# Patient Record
Sex: FEMALE | Race: BLACK OR AFRICAN AMERICAN | NOT HISPANIC OR LATINO | Employment: FULL TIME | URBAN - METROPOLITAN AREA
[De-identification: names, ages, dates, MRNs, and addresses within clinical notes are randomized per-mention and may not be internally consistent; named-entity substitution may affect disease eponyms.]

---

## 2023-08-12 ENCOUNTER — HOSPITAL ENCOUNTER (OUTPATIENT)
Facility: HOSPITAL | Age: 33
Discharge: HOME | DRG: 761 | End: 2023-08-14
Attending: EMERGENCY MEDICINE | Admitting: SPECIALIST
Payer: COMMERCIAL

## 2023-08-12 DIAGNOSIS — N92.0 MENORRHAGIA WITH REGULAR CYCLE: ICD-10-CM

## 2023-08-12 DIAGNOSIS — D25.0 FIBROIDS, SUBMUCOSAL: ICD-10-CM

## 2023-08-12 DIAGNOSIS — D64.9 SYMPTOMATIC ANEMIA: ICD-10-CM

## 2023-08-12 DIAGNOSIS — D64.9 SEVERE ANEMIA: Primary | ICD-10-CM

## 2023-08-12 PROBLEM — D50.0 IRON DEFICIENCY ANEMIA DUE TO CHRONIC BLOOD LOSS: Status: ACTIVE | Noted: 2023-08-12

## 2023-08-12 LAB
ABO + RH BLD: NORMAL
ALBUMIN SERPL BCP-MCNC: 3.5 G/DL (ref 3.5–5.2)
ALP SERPL-CCNC: 35 U/L (ref 55–135)
ALT SERPL W/O P-5'-P-CCNC: <5 U/L (ref 10–44)
ANION GAP SERPL CALC-SCNC: 9 MMOL/L (ref 8–16)
ANISOCYTOSIS BLD QL SMEAR: ABNORMAL
AST SERPL-CCNC: 7 U/L (ref 10–40)
BACTERIA #/AREA URNS HPF: ABNORMAL /HPF
BASOPHILS # BLD AUTO: 0 K/UL (ref 0–0.2)
BASOPHILS NFR BLD: 0 % (ref 0–1.9)
BILIRUB SERPL-MCNC: 0.2 MG/DL (ref 0.1–1)
BILIRUB UR QL STRIP: NEGATIVE
BLD GP AB SCN CELLS X3 SERPL QL: NORMAL
BLD PROD TYP BPU: NORMAL
BLOOD UNIT EXPIRATION DATE: NORMAL
BLOOD UNIT TYPE CODE: 5100
BLOOD UNIT TYPE: NORMAL
BUN SERPL-MCNC: 4 MG/DL (ref 6–20)
CALCIUM SERPL-MCNC: 8.4 MG/DL (ref 8.7–10.5)
CHLORIDE SERPL-SCNC: 110 MMOL/L (ref 95–110)
CK SERPL-CCNC: 41 U/L (ref 20–180)
CLARITY UR: ABNORMAL
CO2 SERPL-SCNC: 21 MMOL/L (ref 23–29)
CODING SYSTEM: NORMAL
COLOR UR: COLORLESS
CREAT SERPL-MCNC: 0.8 MG/DL (ref 0.5–1.4)
CROSSMATCH INTERPRETATION: NORMAL
DACRYOCYTES BLD QL SMEAR: ABNORMAL
DIFFERENTIAL METHOD: ABNORMAL
DISPENSE STATUS: NORMAL
EOSINOPHIL # BLD AUTO: 0 K/UL (ref 0–0.5)
EOSINOPHIL NFR BLD: 0.5 % (ref 0–8)
ERYTHROCYTE [DISTWIDTH] IN BLOOD BY AUTOMATED COUNT: 23.2 % (ref 11.5–14.5)
EST. GFR  (NO RACE VARIABLE): >60 ML/MIN/1.73 M^2
FERRITIN SERPL-MCNC: <1 NG/ML (ref 20–300)
FOLATE SERPL-MCNC: 10.1 NG/ML (ref 4–24)
GLUCOSE SERPL-MCNC: 104 MG/DL (ref 70–110)
GLUCOSE UR QL STRIP: NEGATIVE
HCG INTACT+B SERPL-ACNC: <1.2 MIU/ML
HCT VFR BLD AUTO: 6.4 % (ref 37–48.5)
HCV AB SERPL QL IA: NEGATIVE
HEP C VIRUS HOLD SPECIMEN: NORMAL
HGB BLD-MCNC: 1.6 G/DL (ref 12–16)
HGB BLD-MCNC: 1.7 G/DL (ref 12–16)
HGB UR QL STRIP: ABNORMAL
HIV 1+2 AB+HIV1 P24 AG SERPL QL IA: NEGATIVE
HYALINE CASTS #/AREA URNS LPF: 0 /LPF
HYPOCHROMIA BLD QL SMEAR: ABNORMAL
IMM GRANULOCYTES # BLD AUTO: 0.06 K/UL (ref 0–0.04)
IMM GRANULOCYTES NFR BLD AUTO: 0.8 % (ref 0–0.5)
IRON SERPL-MCNC: 11 UG/DL (ref 30–160)
KETONES UR QL STRIP: NEGATIVE
LDH SERPL L TO P-CCNC: 131 U/L (ref 110–260)
LEUKOCYTE ESTERASE UR QL STRIP: ABNORMAL
LYMPHOCYTES # BLD AUTO: 1.1 K/UL (ref 1–4.8)
LYMPHOCYTES NFR BLD: 15.1 % (ref 18–48)
MCH RBC QN AUTO: 16 PG (ref 27–31)
MCHC RBC AUTO-ENTMCNC: 25 G/DL (ref 32–36)
MCV RBC AUTO: 64 FL (ref 82–98)
MICROSCOPIC COMMENT: ABNORMAL
MONOCYTES # BLD AUTO: 0.5 K/UL (ref 0.3–1)
MONOCYTES NFR BLD: 6.8 % (ref 4–15)
NEUTROPHILS # BLD AUTO: 5.6 K/UL (ref 1.8–7.7)
NEUTROPHILS NFR BLD: 76.8 % (ref 38–73)
NITRITE UR QL STRIP: NEGATIVE
NRBC BLD-RTO: 0 /100 WBC
NUM UNITS TRANS PACKED RBC: NORMAL
OVALOCYTES BLD QL SMEAR: ABNORMAL
PH UR STRIP: 7 [PH] (ref 5–8)
PLATELET # BLD AUTO: 121 K/UL (ref 150–450)
PLATELET BLD QL SMEAR: ABNORMAL
PMV BLD AUTO: 10.5 FL (ref 9.2–12.9)
POIKILOCYTOSIS BLD QL SMEAR: ABNORMAL
POTASSIUM SERPL-SCNC: 3.5 MMOL/L (ref 3.5–5.1)
PROT SERPL-MCNC: 6.1 G/DL (ref 6–8.4)
PROT UR QL STRIP: ABNORMAL
RBC # BLD AUTO: 1 M/UL (ref 4–5.4)
RBC #/AREA URNS HPF: >100 /HPF (ref 0–4)
RETICS/RBC NFR AUTO: 1.9 % (ref 0.5–2.5)
SATURATED IRON: 2 % (ref 20–50)
SODIUM SERPL-SCNC: 140 MMOL/L (ref 136–145)
SP GR UR STRIP: 1.01 (ref 1–1.03)
SPECIMEN OUTDATE: NORMAL
SQUAMOUS #/AREA URNS HPF: 1 /HPF
STOMATOCYTES BLD QL SMEAR: PRESENT
TARGETS BLD QL SMEAR: ABNORMAL
TOTAL IRON BINDING CAPACITY: 522 UG/DL (ref 250–450)
TRANSFERRIN SERPL-MCNC: 353 MG/DL (ref 200–375)
UNIDENT CRYS URNS QL MICRO: ABNORMAL
URN SPEC COLLECT METH UR: ABNORMAL
UROBILINOGEN UR STRIP-ACNC: NEGATIVE EU/DL
VIT B12 SERPL-MCNC: 534 PG/ML (ref 210–950)
WBC # BLD AUTO: 7.34 K/UL (ref 3.9–12.7)
WBC #/AREA URNS HPF: 3 /HPF (ref 0–5)
WBC CLUMPS URNS QL MICRO: ABNORMAL

## 2023-08-12 PROCEDURE — 82728 ASSAY OF FERRITIN: CPT | Performed by: EMERGENCY MEDICINE

## 2023-08-12 PROCEDURE — 85025 COMPLETE CBC W/AUTO DIFF WBC: CPT | Mod: 91 | Performed by: SPECIALIST

## 2023-08-12 PROCEDURE — G0378 HOSPITAL OBSERVATION PER HR: HCPCS

## 2023-08-12 PROCEDURE — 83615 LACTATE (LD) (LDH) ENZYME: CPT | Performed by: EMERGENCY MEDICINE

## 2023-08-12 PROCEDURE — 85018 HEMOGLOBIN: CPT | Performed by: EMERGENCY MEDICINE

## 2023-08-12 PROCEDURE — P9016 RBC LEUKOCYTES REDUCED: HCPCS | Performed by: EMERGENCY MEDICINE

## 2023-08-12 PROCEDURE — 85045 AUTOMATED RETICULOCYTE COUNT: CPT | Performed by: EMERGENCY MEDICINE

## 2023-08-12 PROCEDURE — 99285 EMERGENCY DEPT VISIT HI MDM: CPT | Mod: 25

## 2023-08-12 PROCEDURE — 81000 URINALYSIS NONAUTO W/SCOPE: CPT | Performed by: EMERGENCY MEDICINE

## 2023-08-12 PROCEDURE — 20000000 HC ICU ROOM

## 2023-08-12 PROCEDURE — 82607 VITAMIN B-12: CPT | Performed by: EMERGENCY MEDICINE

## 2023-08-12 PROCEDURE — 36415 COLL VENOUS BLD VENIPUNCTURE: CPT | Performed by: SPECIALIST

## 2023-08-12 PROCEDURE — 82746 ASSAY OF FOLIC ACID SERUM: CPT | Performed by: EMERGENCY MEDICINE

## 2023-08-12 PROCEDURE — 82550 ASSAY OF CK (CPK): CPT | Performed by: EMERGENCY MEDICINE

## 2023-08-12 PROCEDURE — 36430 TRANSFUSION BLD/BLD COMPNT: CPT

## 2023-08-12 PROCEDURE — 80053 COMPREHEN METABOLIC PANEL: CPT | Performed by: EMERGENCY MEDICINE

## 2023-08-12 PROCEDURE — 85025 COMPLETE CBC W/AUTO DIFF WBC: CPT | Performed by: EMERGENCY MEDICINE

## 2023-08-12 PROCEDURE — 86920 COMPATIBILITY TEST SPIN: CPT | Performed by: EMERGENCY MEDICINE

## 2023-08-12 PROCEDURE — 86880 COOMBS TEST DIRECT: CPT | Performed by: EMERGENCY MEDICINE

## 2023-08-12 PROCEDURE — 84466 ASSAY OF TRANSFERRIN: CPT | Performed by: EMERGENCY MEDICINE

## 2023-08-12 PROCEDURE — 87389 HIV-1 AG W/HIV-1&-2 AB AG IA: CPT | Performed by: EMERGENCY MEDICINE

## 2023-08-12 PROCEDURE — 84702 CHORIONIC GONADOTROPIN TEST: CPT | Performed by: EMERGENCY MEDICINE

## 2023-08-12 PROCEDURE — 86803 HEPATITIS C AB TEST: CPT | Performed by: EMERGENCY MEDICINE

## 2023-08-12 PROCEDURE — 36415 COLL VENOUS BLD VENIPUNCTURE: CPT | Performed by: EMERGENCY MEDICINE

## 2023-08-12 PROCEDURE — 86900 BLOOD TYPING SEROLOGIC ABO: CPT | Performed by: EMERGENCY MEDICINE

## 2023-08-12 PROCEDURE — 83020 HEMOGLOBIN ELECTROPHORESIS: CPT | Performed by: EMERGENCY MEDICINE

## 2023-08-12 PROCEDURE — 86920 COMPATIBILITY TEST SPIN: CPT | Performed by: NURSE PRACTITIONER

## 2023-08-12 RX ORDER — SODIUM CHLORIDE 0.9 % (FLUSH) 0.9 %
10 SYRINGE (ML) INJECTION
Status: DISCONTINUED | OUTPATIENT
Start: 2023-08-12 | End: 2023-08-14 | Stop reason: HOSPADM

## 2023-08-12 RX ORDER — HYDROCODONE BITARTRATE AND ACETAMINOPHEN 500; 5 MG/1; MG/1
TABLET ORAL
Status: DISCONTINUED | OUTPATIENT
Start: 2023-08-12 | End: 2023-08-14 | Stop reason: HOSPADM

## 2023-08-12 NOTE — ED PROVIDER NOTES
"SCRIBE #1 NOTE: I, Abbyfabiana Landeros, am scribing for, and in the presence of, Sean Reeder MD. I have scribed the entire note.       History     Chief Complaint   Patient presents with    Dizziness     X 10 days, states on her period for 10 days.      Review of patient's allergies indicates:  No Known Allergies      History of Present Illness     HPI    8/12/2023, 12:31 PM  History obtained from the patient      History of Present Illness: Crista Momin is a 33 y.o. female patient who presents to the Emergency Department for evaluation of dizziness which onset 10 days ago. Pt has had her menstrual cycle for the last 12 days and says that she is bleeding more than normal. Normally her menstrual cycle lasts for 7 days and she uses an average of 3-4 pads per day. But, it has been 12 days now and she is using an average of 4-5 pads per day. Symptoms are constant and moderate in severity. No mitigating or exacerbating factors reported. Associated sxs include HA, light-headedness, and vaginal bleeding. Patient denies any abdominal, dark stool, and blood in stool. Pt has been eating "a lot of ice" and has been taking Aleve. No further complaints or concerns at this time.       Arrival mode: Personal vehicle    PCP: No primary care provider on file.        Past Medical History:  History reviewed. No pertinent past medical history.    Past Surgical History:  No past surgical history on file.      Family History:  No family history on file.    Social History:  Social History     Tobacco Use    Smoking status: Not on file    Smokeless tobacco: Not on file   Substance and Sexual Activity    Alcohol use: Not on file    Drug use: Not on file    Sexual activity: Not on file        Review of Systems     Review of Systems   Gastrointestinal:  Negative for abdominal pain and blood in stool.        (-) black stool   Genitourinary:  Positive for vaginal bleeding.   Neurological:  Positive for dizziness, light-headedness and " headaches.      Physical Exam     Initial Vitals [08/12/23 1140]   BP Pulse Resp Temp SpO2   133/63 (!) 112 20 98 °F (36.7 °C) 100 %      MAP       --          Physical Exam  Nursing Notes and Vital Signs Reviewed.  Constitutional: Patient is in no apparent distress. Well-developed and well-nourished.  Head: Atraumatic. Normocephalic.  Eyes: PERRL. EOM intact. Conjunctivae are pale. No scleral icterus.  ENT: Mucous membranes are moist. Oropharynx is clear and symmetric.    Neck: Supple. Full ROM.  Cardiovascular: Tachycardic. Regular rhythm. No murmurs, rubs, or gallops. Distal pulses are 2+ and symmetric.  Pulmonary/Chest: No respiratory distress. Clear to auscultation bilaterally. No wheezing or rales.  Abdominal: Soft and non-distended. There is no tenderness.  No rebound, guarding, or rigidity.  Genitourinary: No CVA tenderness  Musculoskeletal: Moves all extremities. No obvious deformities. No edema.  Skin: Warm and dry.  Neurological:  Alert, awake, and appropriate. Normal speech.  No acute focal neurological deficits are appreciated.  Psychiatric: Normal affect. Good eye contact. Appropriate in content.     ED Course   Critical Care    Date/Time: 8/12/2023 3:18 PM    Performed by: Sean Reeder MD  Authorized by: Sean Reeder MD  Total critical care time (exclusive of procedural time) : 0 minutes  Critical care time was exclusive of separately billable procedures and treating other patients and teaching time.  Critical care was necessary to treat or prevent imminent or life-threatening deterioration of the following conditions: Anemia.  Critical care was time spent personally by me on the following activities: blood draw for specimens, development of treatment plan with patient or surrogate, discussions with consultants, interpretation of cardiac output measurements, examination of patient, evaluation of patient's response to treatment, obtaining history from patient or surrogate, ordering and performing  treatments and interventions, review of old charts, re-evaluation of patient's condition, pulse oximetry, ordering and review of radiographic studies and ordering and review of laboratory studies.        ED Vital Signs:  Vitals:    08/13/23 0230 08/13/23 0245 08/13/23 0300 08/13/23 0315   BP: 129/67 122/72 125/73 125/73   Pulse: 74 69 68 70   Resp: 19 19 19 20   Temp: 98 °F (36.7 °C) 98 °F (36.7 °C)  98 °F (36.7 °C)   TempSrc: Oral Oral  Oral   SpO2: 100% 100% 100% 100%   Weight:       Height:        08/13/23 0400 08/13/23 0406 08/13/23 0407 08/13/23 0421   BP: 133/76 133/76 133/76 126/74   Pulse: 65 66 65 67   Resp: 20 19 19 19   Temp: 98.3 °F (36.8 °C) 98.3 °F (36.8 °C)  97.9 °F (36.6 °C)   TempSrc: Oral Oral  Oral   SpO2: 100% 100% 100% 100%   Weight:       Height:        08/13/23 0500 08/13/23 0545 08/13/23 0600 08/13/23 0715   BP: 131/68  132/76 131/69   Pulse: 68 68 67 75   Resp: 18 20 19 18   Temp:   98 °F (36.7 °C) 98 °F (36.7 °C)   TempSrc:   Oral Oral   SpO2: 100% 100% 100% 100%   Weight:       Height:        08/13/23 0800 08/13/23 0901 08/13/23 1109   BP: 121/85 125/72 129/74   Pulse: 69 71 72   Resp: 18 16 20   Temp:   98.2 °F (36.8 °C)   TempSrc:   Oral   SpO2: 100% 100% 100%   Weight:      Height:          Abnormal Lab Results:  Labs Reviewed   CBC W/ AUTO DIFFERENTIAL - Abnormal; Notable for the following components:       Result Value    RBC 1.00 (*)     Hemoglobin 1.6 (*)     Hematocrit 6.4 (*)     MCV 64 (*)     MCH 16.0 (*)     MCHC 25.0 (*)     RDW 23.2 (*)     Platelets 121 (*)     Immature Granulocytes 0.8 (*)     Immature Grans (Abs) 0.06 (*)     Gran % 76.8 (*)     Lymph % 15.1 (*)     All other components within normal limits    Narrative:     Release to patient->Immediate  H&H critical result(s) called and verbal readback obtained from   SANDOR SCHILLING RN by FRANK 08/12/2023 12:49   COMPREHENSIVE METABOLIC PANEL - Abnormal; Notable for the following components:    CO2 21 (*)     BUN 4 (*)      Calcium 8.4 (*)     Alkaline Phosphatase 35 (*)     AST 7 (*)     ALT <5 (*)     All other components within normal limits    Narrative:     Release to patient->Immediate   URINALYSIS, REFLEX TO URINE CULTURE - Abnormal; Notable for the following components:    Color, UA Colorless (*)     Appearance, UA Hazy (*)     Protein, UA 2+ (*)     Occult Blood UA 3+ (*)     Leukocytes, UA 1+ (*)     All other components within normal limits    Narrative:     Specimen Source->Urine   IRON AND TIBC - Abnormal; Notable for the following components:    Iron 11 (*)     TIBC 522 (*)     Saturated Iron 2 (*)     All other components within normal limits   HEMOGLOBIN - Abnormal; Notable for the following components:    Hemoglobin 1.7 (*)     All other components within normal limits    Narrative:     HGB critical result(s) called and verbal readback obtained from   BLANCA PACK by FRANK 08/12/2023 13:31   FERRITIN - Abnormal; Notable for the following components:    Ferritin <1 (*)     All other components within normal limits   URINALYSIS MICROSCOPIC - Abnormal; Notable for the following components:    RBC, UA >100 (*)     WBC Clumps, UA Few (*)     Unclass Taylor UA Many (*)     All other components within normal limits    Narrative:     Specimen Source->Urine   HIV 1 / 2 ANTIBODY    Narrative:     Release to patient->Immediate   HEPATITIS C ANTIBODY    Narrative:     Release to patient->Immediate   HEP C VIRUS HOLD SPECIMEN    Narrative:     Release to patient->Immediate   HCG, QUANTITATIVE    Narrative:     Release to patient->Immediate   RETICULOCYTES   FOLATE   VITAMIN B12   LACTATE DEHYDROGENASE   CK   FERRITIN   HEMOGLOBIN ELECTROPHORESIS,HGB A2 JIA.   TYPE & SCREEN   PREPARE RBC SOFT        All Lab Results:  Results for orders placed or performed during the hospital encounter of 08/12/23   HIV 1/2 Ag/Ab (4th Gen)   Result Value Ref Range    HIV 1/2 Ag/Ab Negative Negative   Hepatitis C Antibody   Result Value Ref Range     Hepatitis C Ab Negative Negative   HCV Virus Hold Specimen   Result Value Ref Range    HEP C Virus Hold Specimen Hold for HCV sendout    CBC auto differential   Result Value Ref Range    WBC 7.34 3.90 - 12.70 K/uL    RBC 1.00 (L) 4.00 - 5.40 M/uL    Hemoglobin 1.6 (LL) 12.0 - 16.0 g/dL    Hematocrit 6.4 (LL) 37.0 - 48.5 %    MCV 64 (L) 82 - 98 fL    MCH 16.0 (L) 27.0 - 31.0 pg    MCHC 25.0 (L) 32.0 - 36.0 g/dL    RDW 23.2 (H) 11.5 - 14.5 %    Platelets 121 (L) 150 - 450 K/uL    MPV 10.5 9.2 - 12.9 fL    Immature Granulocytes 0.8 (H) 0.0 - 0.5 %    Gran # (ANC) 5.6 1.8 - 7.7 K/uL    Immature Grans (Abs) 0.06 (H) 0.00 - 0.04 K/uL    Lymph # 1.1 1.0 - 4.8 K/uL    Mono # 0.5 0.3 - 1.0 K/uL    Eos # 0.0 0.0 - 0.5 K/uL    Baso # 0.00 0.00 - 0.20 K/uL    nRBC 0 0 /100 WBC    Gran % 76.8 (H) 38.0 - 73.0 %    Lymph % 15.1 (L) 18.0 - 48.0 %    Mono % 6.8 4.0 - 15.0 %    Eosinophil % 0.5 0.0 - 8.0 %    Basophil % 0.0 0.0 - 1.9 %    Platelet Estimate Appears normal     Aniso Marked     Poik Marked     Hypo Marked     Ovalocytes Moderate     Target Cells Occasional     Tear Drop Cells Occasional     Stomatocytes Present     Differential Method Automated    Comprehensive metabolic panel   Result Value Ref Range    Sodium 140 136 - 145 mmol/L    Potassium 3.5 3.5 - 5.1 mmol/L    Chloride 110 95 - 110 mmol/L    CO2 21 (L) 23 - 29 mmol/L    Glucose 104 70 - 110 mg/dL    BUN 4 (L) 6 - 20 mg/dL    Creatinine 0.8 0.5 - 1.4 mg/dL    Calcium 8.4 (L) 8.7 - 10.5 mg/dL    Total Protein 6.1 6.0 - 8.4 g/dL    Albumin 3.5 3.5 - 5.2 g/dL    Total Bilirubin 0.2 0.1 - 1.0 mg/dL    Alkaline Phosphatase 35 (L) 55 - 135 U/L    AST 7 (L) 10 - 40 U/L    ALT <5 (L) 10 - 44 U/L    eGFR >60 >60 mL/min/1.73 m^2    Anion Gap 9 8 - 16 mmol/L   Urinalysis, Reflex to Urine Culture Urine, Clean Catch    Specimen: Urine   Result Value Ref Range    Specimen UA Urine, Clean Catch     Color, UA Colorless (A) Yellow, Straw, Madelin    Appearance, UA Hazy (A) Clear     pH, UA 7.0 5.0 - 8.0    Specific Gravity, UA 1.010 1.005 - 1.030    Protein, UA 2+ (A) Negative    Glucose, UA Negative Negative    Ketones, UA Negative Negative    Bilirubin (UA) Negative Negative    Occult Blood UA 3+ (A) Negative    Nitrite, UA Negative Negative    Urobilinogen, UA Negative <2.0 EU/dL    Leukocytes, UA 1+ (A) Negative   hCG, quantitative, pregnancy   Result Value Ref Range    HCG Quant <1.2 See Text mIU/mL   Reticulocytes   Result Value Ref Range    Retic 1.9 0.5 - 2.5 %   Folate   Result Value Ref Range    Folate 10.1 4.0 - 24.0 ng/mL   Vitamin B12   Result Value Ref Range    Vitamin B-12 534 210 - 950 pg/mL   Iron and TIBC   Result Value Ref Range    Iron 11 (L) 30 - 160 ug/dL    Transferrin 353 200 - 375 mg/dL    TIBC 522 (H) 250 - 450 ug/dL    Saturated Iron 2 (L) 20 - 50 %   Lactate dehydrogenase   Result Value Ref Range     110 - 260 U/L   CK   Result Value Ref Range    CPK 41 20 - 180 U/L   Hemoglobin   Result Value Ref Range    Hemoglobin 1.7 (LL) 12.0 - 16.0 g/dL   Ferritin   Result Value Ref Range    Ferritin <1 (L) 20.0 - 300.0 ng/mL   Urinalysis Microscopic   Result Value Ref Range    RBC, UA >100 (H) 0 - 4 /hpf    WBC, UA 3 0 - 5 /hpf    WBC Clumps, UA Few (A) None-Rare    Bacteria Rare None-Occ /hpf    Squam Epithel, UA 1 /hpf    Hyaline Casts, UA 0 0-1/lpf /lpf    Unclass Taylor UA Many (A) None-Moderate    Microscopic Comment SEE COMMENT    CBC auto differential   Result Value Ref Range    WBC 8.09 3.90 - 12.70 K/uL    RBC 2.05 (L) 4.00 - 5.40 M/uL    Hemoglobin 5.0 (LL) 12.0 - 16.0 g/dL    Hematocrit 16.0 (LL) 37.0 - 48.5 %    MCV 78 (L) 82 - 98 fL    MCH 24.4 (L) 27.0 - 31.0 pg    MCHC 31.3 (L) 32.0 - 36.0 g/dL    RDW 22.5 (H) 11.5 - 14.5 %    Platelets 131 (L) 150 - 450 K/uL    MPV 10.5 9.2 - 12.9 fL    Immature Granulocytes 1.6 (H) 0.0 - 0.5 %    Gran # (ANC) 5.2 1.8 - 7.7 K/uL    Immature Grans (Abs) 0.13 (H) 0.00 - 0.04 K/uL    Lymph # 1.9 1.0 - 4.8 K/uL    Mono # 0.8  0.3 - 1.0 K/uL    Eos # 0.1 0.0 - 0.5 K/uL    Baso # 0.03 0.00 - 0.20 K/uL    nRBC 0 0 /100 WBC    Gran % 63.7 38.0 - 73.0 %    Lymph % 24.0 18.0 - 48.0 %    Mono % 9.3 4.0 - 15.0 %    Eosinophil % 1.0 0.0 - 8.0 %    Basophil % 0.4 0.0 - 1.9 %    Platelet Estimate Appears normal     Aniso Marked     Poik Marked     Hypo Marked     Ovalocytes Moderate     Target Cells Occasional     Tear Drop Cells Occasional     Schistocytes Present     Differential Method Automated    CBC auto differential   Result Value Ref Range    WBC 7.35 3.90 - 12.70 K/uL    RBC 2.59 (L) 4.00 - 5.40 M/uL    Hemoglobin 6.4 (L) 12.0 - 16.0 g/dL    Hematocrit 20.4 (L) 37.0 - 48.5 %    MCV 79 (L) 82 - 98 fL    MCH 24.7 (L) 27.0 - 31.0 pg    MCHC 31.4 (L) 32.0 - 36.0 g/dL    RDW 19.2 (H) 11.5 - 14.5 %    Platelets 118 (L) 150 - 450 K/uL    MPV 10.0 9.2 - 12.9 fL    Immature Granulocytes 1.6 (H) 0.0 - 0.5 %    Gran # (ANC) 5.6 1.8 - 7.7 K/uL    Immature Grans (Abs) 0.12 (H) 0.00 - 0.04 K/uL    Lymph # 1.0 1.0 - 4.8 K/uL    Mono # 0.5 0.3 - 1.0 K/uL    Eos # 0.2 0.0 - 0.5 K/uL    Baso # 0.04 0.00 - 0.20 K/uL    nRBC 0 0 /100 WBC    Gran % 75.6 (H) 38.0 - 73.0 %    Lymph % 13.3 (L) 18.0 - 48.0 %    Mono % 6.8 4.0 - 15.0 %    Eosinophil % 2.2 0.0 - 8.0 %    Basophil % 0.5 0.0 - 1.9 %    Differential Method Automated    Renal function panel   Result Value Ref Range    Glucose 90 70 - 110 mg/dL    Sodium 139 136 - 145 mmol/L    Potassium 4.0 3.5 - 5.1 mmol/L    Chloride 108 95 - 110 mmol/L    CO2 21 (L) 23 - 29 mmol/L    BUN 4 (L) 6 - 20 mg/dL    Calcium 8.2 (L) 8.7 - 10.5 mg/dL    Creatinine 0.7 0.5 - 1.4 mg/dL    Albumin 3.2 (L) 3.5 - 5.2 g/dL    Phosphorus 3.4 2.7 - 4.5 mg/dL    eGFR >60 >60 mL/min/1.73 m^2    Anion Gap 10 8 - 16 mmol/L   Magnesium   Result Value Ref Range    Magnesium 2.2 1.6 - 2.6 mg/dL   Fibrinogen   Result Value Ref Range    Fibrinogen 218 182 - 400 mg/dL   Protime-INR   Result Value Ref Range    Prothrombin Time 10.6 9.0 - 12.5  sec    INR 0.9 0.8 - 1.2   APTT   Result Value Ref Range    aPTT <21.0 21.0 - 32.0 sec   Type & Screen   Result Value Ref Range    Group & Rh O POS     Indirect Emily NEG     Specimen Outdate 08/15/2023 23:59    Prepare RBC 3 Units; severe anemia   Result Value Ref Range    UNIT NUMBER [id number]     Product Code X4652R87     DISPENSE STATUS TRANSFUSED     CODING SYSTEM GQFA947     Unit Blood Type Code 5100     Unit Blood Type O POS     Unit Expiration [id number]     CROSSMATCH INTERPRETATION Compatible     UNIT NUMBER [id number]     Product Code E9882Z33     DISPENSE STATUS TRANSFUSED     CODING SYSTEM UPQV022     Unit Blood Type Code 5100     Unit Blood Type O POS     Unit Expiration [id number]     CROSSMATCH INTERPRETATION Compatible     UNIT NUMBER [id number]     Product Code X8365N27     DISPENSE STATUS TRANSFUSED     CODING SYSTEM CAND757     Unit Blood Type Code 5100     Unit Blood Type O POS     Unit Expiration [id number]     CROSSMATCH INTERPRETATION Compatible    Prepare RBC 2 Units; h/h is 5/16   Result Value Ref Range    UNIT NUMBER [id number]     Product Code H0723M45     DISPENSE STATUS ISSUED     CODING SYSTEM RLED428     Unit Blood Type Code 5100     Unit Blood Type O POS     Unit Expiration [id number]     CROSSMATCH INTERPRETATION Compatible     UNIT NUMBER [id number]     Product Code V2794N37     DISPENSE STATUS ISSUED     CODING SYSTEM UHXP287     Unit Blood Type Code 5100     Unit Blood Type O POS     Unit Expiration [id number]     CROSSMATCH INTERPRETATION Compatible         Imaging Results:  Imaging Results              US Pelvis Comp with Transvag NON-OB (xpd) (Final result)  Result time 08/12/23 16:56:05   Procedure changed from US Pelvis Complete Non OB     Final result by Luz Elena Barcenas MD (08/12/23 16:56:05)                   Impression:      Leiomyomatous uterus likely      Electronically signed by: Luz Elena Barcenas  Date:    08/12/2023  Time:    16:56                Narrative:    EXAMINATION:  US PELVIS COMP WITH TRANSVAG NON-OB (XPD)    CLINICAL HISTORY:  menorrhagia with severe anemia;    TECHNIQUE:  Transabdominal sonography of the pelvis was performed, followed by transvaginal sonography to better evaluate the uterus and ovaries.    COMPARISON:  None.    FINDINGS:  Uterine length up to 10 cm.  Uterine leiomyomata suspected largest measurable lesion 4 cm.  Endometrial stripe not discriminated possibly distorted by fibroid submucosal.  Ovaries unremarkable with positive Doppler flow.  No sizable ascites or fluid collection                                                The Emergency Provider reviewed the vital signs and test results, which are outlined above.     ED Discussion       2:23 PM: Dr. Reeder ordered 3 units of prbc because pt has had a menstrual cycle with heavy bleeding for several days.    2:25 PM: Discussed pt's case with Dr. Betancourt (Critical Care Medicine) who recommends admitting pt to his service for treatment and further workup due to the pt's dizziness x 10 days. Also pt's H/H 1.6/6, platelet count 123 K, retic count 1.9.     2:26 PM: Discussed pt's case with Dr. Luque (Hematology and Oncology) who recommends monitoring end organ concerns due to the pt's severity of anemia. Dr. Luque also recommends adding ferritin to pre-txfusion labs and gyn c/s for severe menorrhagia.    2:29 PM:  states that pt is being admitted to ICU and that he will add ferritin and consult to gynecology.    3:31 PM: Discussed pt's case with Dr. Tobin (Obstetrics and Gynecology) who will see pt in office within 2 weeks to get her worked up to see if menorrhagia is a possible cause of the pt's anemia.    3:51 PM: Discussed case with Dr. Betancourt (Critical Care Medicine). Dr. Betancourt agrees with current care and management of pt and accepts admission.   Admitting Service: Critical Care Medicine  Admitting Physician: Dr. Betancourt  Admit to: obs     3:52 PM:  Re-evaluated pt. I have discussed test results, shared treatment plan, and the need for admission with patient and family at bedside. Pt and family express understanding at this time and agree with all information. All questions answered. Pt and family have no further questions or concerns at this time. Pt is ready for admit.      Medical Decision Making:   Clinical Tests:   Lab Tests: Ordered and Reviewed           ED Medication(s):  Medications   0.9%  NaCl infusion (for blood administration) (has no administration in time range)   sodium chloride 0.9% flush 10 mL (has no administration in time range)   0.9%  NaCl infusion (for blood administration) (has no administration in time range)   mupirocin 2 % ointment ( Nasal Given 8/13/23 0053)   folic acid tablet 1 mg (1 mg Oral Given 8/13/23 1122)   iron sucrose injection 200 mg (200 mg Intravenous Given 8/13/23 1122)   medroxyPROGESTERone tablet 10 mg (10 mg Oral Given 8/13/23 1122)       There are no discharge medications for this patient.       Follow-up Information       Alba Tobin MD. Go in 1 week(s).    Specialty: Obstetrics and Gynecology  Why: [facility] office (hosp f/u)  Contact information:  [address] [facility] [address] [phone]                                 Scribe Attestation:   Scribe #1: I performed the above scribed service and the documentation accurately describes the services I performed. I attest to the accuracy of the note.     Attending:   Physician Attestation Statement for Scribe #1: I, Sean Reeder MD, personally performed the services described in this documentation, as scribed by Abby Landeros, in my presence, and it is both accurate and complete.           Clinical Impression       ICD-10-CM ICD-9-CM   1. Severe anemia  D64.9 285.9   2. Symptomatic anemia  D64.9 285.9       Disposition:   Disposition: Admitted  Condition: Fair        Sean Reeder MD  08/13/23 5285

## 2023-08-12 NOTE — HPI
Information obtained from chart review and discussion with patient.     33-year-old female with no known medical history as she does not seek routine medical care who presented to ED 8/12/2023 for evaluation of dizziness. ED evaluation revealing H/H 1.7/6.4. Hemodynamics stable. She was typed and screen and 3 units PRBC were ordered. Patient noted heavy, extended menses this month. GYN and Hematology consulted. Critical care consulted for close hemodynamic monitoring during transfusion with critically low H/H.    Upon exam, Ms. Momin looks surprisingly non-toxic and comfortable. She has no known medical history as she has not seen any type of provider since her early 20s. She does not take any medications on a regular basis. She denies vaping, tobacco use, illicit drug use, and ETOH use. She has regular menstrual cycles that typically last 7 days with the first 3-4 days being heavier with clots. This month, her period lasted 12 days and was fairly heavy for about 7 of those days. She has been experiencing dizziness and lightheadedness, mostly when changing positions, for the past 10 days. She has been having intermittent SOB (after walking about 50 ft.) since May 2023. She used to eat baking flour but stopped about a year ago. In the past few months she has been eating a lot of ice. She denies syncope.

## 2023-08-12 NOTE — ED NOTES
Unable to print or collect direct antiglobulin test lab. Call to lab. They will manually add on order.

## 2023-08-12 NOTE — SUBJECTIVE & OBJECTIVE
OB History   No obstetric history on file.     History reviewed. No pertinent past medical history.  No past surgical history on file.    (Not in a hospital admission)      Review of patient's allergies indicates:  No Known Allergies     Family History    None       Tobacco Use    Smoking status: Not on file    Smokeless tobacco: Not on file   Substance and Sexual Activity    Alcohol use: Not on file    Drug use: Not on file    Sexual activity: Not on file     Review of Systems   Genitourinary:  Positive for menorrhagia and menstrual problem.   Neurological:  Positive for syncope.   All other systems reviewed and are negative.     Objective:     Vital Signs (Most Recent):  Temp: 97.6 °F (36.4 °C) (08/12/23 1500)  Pulse: 91 (08/12/23 1500)  Resp: 10 (08/12/23 1500)  BP: 133/60 (08/12/23 1500)  SpO2: 100 % (08/12/23 1500) Vital Signs (24h Range):  Temp:  [97.6 °F (36.4 °C)-98.8 °F (37.1 °C)] 97.6 °F (36.4 °C)  Pulse:  [] 91  Resp:  [9-20] 10  SpO2:  [98 %-100 %] 100 %  BP: (114-133)/(56-63) 133/60     Weight: 100 kg (220 lb 9.1 oz)  There is no height or weight on file to calculate BMI.    No LMP recorded.     Physical Exam:   Constitutional: She appears well-developed.     Eyes: Pupils are equal, round, and reactive to light. Conjunctivae and EOM are normal.      Pulmonary/Chest: Effort normal. Right breast exhibits no mass, no nipple discharge, no skin change, no tenderness and presence. Left breast exhibits no mass, no nipple discharge, no skin change, no tenderness and presence. Breasts are symmetrical.        Abdominal: Soft.     Genitourinary:    Inguinal canal, uterus, right adnexa, left adnexa and rectum normal.      Pelvic exam was performed with patient supine.   The external female genitalia was normal.   Labial bartholins normal.Cervix is normal. There is vaginal discharge (scant pink) in the vagina. Uterus size: 8 cm.          Musculoskeletal: Normal range of motion.       Neurological: She is  alert.    Skin: Skin is warm.    Psychiatric: She has a normal mood and affect.        Laboratory:  Recent Lab Results         08/12/23  1458   08/12/23  1307   08/12/23  1224   08/12/23  1209        Unit Blood Type Code   5100  [P]              5100  [P]              5100  [P]           Unit Expiration   [id number]  [P]              [id number]  [P]              [id number]  [P]           Unit Blood Type   O POS  [P]              O POS  [P]              O POS  [P]           Albumin     3.5         Alkaline Phosphatase     35         ALT     <5         Anion Gap     9         Aniso     Marked         Appearance, UA Hazy             AST     7         Bacteria, UA Rare             Baso #     0.00         Basophil %     0.0         Bilirubin (UA) Negative             BILIRUBIN TOTAL     0.2  Comment: For infants and newborns, interpretation of results should be based  on gestational age, weight and in agreement with clinical  observations.    Premature Infant recommended reference ranges:  Up to 24 hours.............<8.0 mg/dL  Up to 48 hours............<12.0 mg/dL  3-5 days..................<15.0 mg/dL  6-29 days.................<15.0 mg/dL           BUN     4         Calcium     8.4         Chloride     110         CO2     21         CODING SYSTEM   [id number]  [P]              [id number]  [P]              [id number]  [P]           Color, UA Colorless             CPK   41           Creatinine     0.8         Crossmatch Interpretation   Compatible  [P]              Compatible  [P]              Compatible  [P]           Differential Method     Automated         DISPENSE STATUS   ISSUED  [P]              CROSSMATCHED  [P]              CROSSMATCHED  [P]           eGFR     >60         Eos #     0.0         Eosinophil %     0.5         Ferritin   <1           Glucose     104         Glucose, UA Negative             Gran # (ANC)     5.6         Gran %     76.8         Group & Rh   O POS           HCG Quant     <1.2  Comment:  Quantitative HCG Reference Ranges:  Males........................<5.0 mIU/mL  Non-Pregnant Females.........<5.0 mIU/mL  Pregnancy:  Weeks post-LMP...............Range (mIU/mL)  1-10  weeks.....................202-231,000  11-15 weeks..................22,536-234,990  16-22 weeks...................8,007-50,064  23-40 weeks...................1,600-49,413    NOTE:  This assay is not FDA approved for tumor screening,   diagnosis, or monitoring.           Hematocrit     6.4  Comment: H&H critical result(s) called and verbal readback obtained from   SANDOR SCHILLING RN by [facility] [place] 08/12/2023 12:49           Hemoglobin   1.7  Comment: HGB critical result(s) called and verbal readback obtained from   BLANCA PACK by [facility] [place] 08/12/2023 13:31     1.6  Comment: H&H critical result(s) called and verbal readback obtained from   SANDOR SCHILLING RN by [facility] [place] 08/12/2023 12:49           Hepatitis C Ab       Negative       HEP C Virus Hold Specimen       Hold for HCV sendout       HIV 1/2 Ag/Ab       Negative       Hyaline Casts, UA 0             Hypo     Marked         Immature Grans (Abs)     0.06  Comment: Mild elevation in immature granulocytes is non specific and   can be seen in a variety of conditions including stress response,   acute inflammation, trauma and pregnancy. Correlation with other   laboratory and clinical findings is essential.           Immature Granulocytes     0.8         INDIRECT NA   NEG           Ketones, UA Negative             LD   131  Comment: Results are increased in hemolyzed samples.           Leukocytes, UA 1+             Lymph #     1.1         Lymph %     15.1         MCH     16.0         MCHC     25.0         MCV     64         Microscopic Comment SEE COMMENT  Comment: Other formed elements not mentioned in the report are not   present in the microscopic examination.                Mono #     0.5         Mono %     6.8         MPV     10.5         NITRITE UA Negative             nRBC     0         Occult Blood  UA 3+             Ovalocytes     Moderate         pH, UA 7.0             Platelet Estimate     Appears normal         Platelets     121         Poikilocytosis     Marked         Potassium     3.5         Product Code   N0672B25  [P]              [id number]  [P]              [id number]  [P]           PROTEIN TOTAL     6.1         Protein, UA 2+  Comment: Recommend a 24 hour urine protein or a urine   protein/creatinine ratio if globulin induced proteinuria is  clinically suspected.               RBC     1.00         RBC, UA >100             RDW     23.2         Retic   1.9           Sodium     140         Specific [place], UA 1.010             Specimen Outdate   08/15/2023 23:59           Specimen UA Urine, Clean Catch             Squam Epithel, UA 1             Stomatocytes     Present         Target Cells     Occasional         Teardrop Cells     Occasional         Unclass [place] UA Many             UNIT NUMBER   [id number]  [P]              [id number]  [P]              [id number]  [P]           UROBILINOGEN UA Negative             WBC Clumps, UA Few             WBC, UA 3             WBC     7.34                  [P] - Preliminary Result             I have personallly reviewed all pertinent lab results from the last 24 hours.    Diagnostic Results:  Pelvic sono pending

## 2023-08-12 NOTE — HPI
Asked to see patient for history of heavy cycles  34 y/o g0 reports heavy menses all her life.  Reports never seen by gyn and does not have a pcp.  In ER today for dizziness x 10d .  Found to have a h/h of 1.6/6    Patient reports menses started age 10; describes cycles as heavy for her entire life.  Menses usually monthly, flow 7 days, heavy for 3 days; using overnight pads, change q 3-4 hr;   Does not need to double up; dysmenorrhea controlled with aleve.  Most recent menses has lasted 13 d and only having occasional spotting.  Reports this menses was  Heavy for 5 days.    +sexually active, male partners, last intercourse 3.5 yrs ago, +condoms

## 2023-08-12 NOTE — ASSESSMENT & PLAN NOTE
08/12/2023   Currently no longer bleeding  Agree with blood transfusion  Pelvic sono results pending  Pt agrees to follow up with me in gyn clinic

## 2023-08-12 NOTE — CONSULTS
[facility] - Emergency Dept.  Obstetrics & Gynecology  Consult Note    Patient Name: Crista Momin  MRN: [id number]  Admission Date: 8/12/2023  Hospital Length of Stay: 0 days  Code Status: No Order  Primary Care Provider: No primary care provider on file.  Principal Problem: <principal problem not specified>    Consults  Subjective:     Chief Complaint: dizziness    History of Present Illness:  Asked to see patient for history of heavy cycles  34 y/o g0 reports heavy menses all her life.  Reports never seen by gyn and does not have a pcp.  In ER today for dizziness x 10d .  Found to have a h/h of 1.6/6    Patient reports menses started age 10; describes cycles as heavy for her entire life.  Menses usually monthly, flow 7 days, heavy for 3 days; using overnight pads, change q 3-4 hr;   Does not need to double up; dysmenorrhea controlled with aleve.  Most recent menses has lasted 13 d and only having occasional spotting.  Reports this menses was  Heavy for 5 days.    +sexually active, male partners, last intercourse 3.5 yrs ago, +condoms          OB History   No obstetric history on file.     History reviewed. No pertinent past medical history.  No past surgical history on file.    (Not in a hospital admission)      Review of patient's allergies indicates:  No Known Allergies     Family History    None       Tobacco Use    Smoking status: Not on file    Smokeless tobacco: Not on file   Substance and Sexual Activity    Alcohol use: Not on file    Drug use: Not on file    Sexual activity: Not on file     Review of Systems   Genitourinary:  Positive for menorrhagia and menstrual problem.   Neurological:  Positive for syncope.   All other systems reviewed and are negative.     Objective:     Vital Signs (Most Recent):  Temp: 97.6 °F (36.4 °C) (08/12/23 1500)  Pulse: 91 (08/12/23 1500)  Resp: 10 (08/12/23 1500)  BP: 133/60 (08/12/23 1500)  SpO2: 100 % (08/12/23 1500) Vital Signs (24h Range):  Temp:  [97.6 °F  (36.4 °C)-98.8 °F (37.1 °C)] 97.6 °F (36.4 °C)  Pulse:  [] 91  Resp:  [9-20] 10  SpO2:  [98 %-100 %] 100 %  BP: (114-133)/(56-63) 133/60     Weight: 100 kg (220 lb 9.1 oz)  There is no height or weight on file to calculate BMI.    No LMP recorded.     Physical Exam:   Constitutional: She appears well-developed.     Eyes: Pupils are equal, round, and reactive to light. Conjunctivae and EOM are normal.      Pulmonary/Chest: Effort normal. Right breast exhibits no mass, no nipple discharge, no skin change, no tenderness and presence. Left breast exhibits no mass, no nipple discharge, no skin change, no tenderness and presence. Breasts are symmetrical.        Abdominal: Soft.     Genitourinary:    Inguinal canal, uterus, right adnexa, left adnexa and rectum normal.      Pelvic exam was performed with patient supine.   The external female genitalia was normal.   Labial bartholins normal.Cervix is normal. There is vaginal discharge (scant pink) in the vagina. Uterus size: 8 cm.          Musculoskeletal: Normal range of motion.       Neurological: She is alert.    Skin: Skin is warm.    Psychiatric: She has a normal mood and affect.        Laboratory:  Recent Lab Results         08/12/23  1458   08/12/23  1307   08/12/23  1224   08/12/23  1209        Unit Blood Type Code   5100  [P]              5100  [P]              5100  [P]           Unit Expiration   [id number]  [P]              [id number]  [P]              [id number]  [P]           Unit Blood Type   O POS  [P]              O POS  [P]              O POS  [P]           Albumin     3.5         Alkaline Phosphatase     35         ALT     <5         Anion Gap     9         Aniso     Marked         Appearance, UA Hazy             AST     7         Bacteria, UA Rare             Baso #     0.00         Basophil %     0.0         Bilirubin (UA) Negative             BILIRUBIN TOTAL     0.2  Comment: For infants and newborns, interpretation of results should be  based  on gestational age, weight and in agreement with clinical  observations.    Premature Infant recommended reference ranges:  Up to 24 hours.............<8.0 mg/dL  Up to 48 hours............<12.0 mg/dL  3-5 days..................<15.0 mg/dL  6-29 days.................<15.0 mg/dL           BUN     4         Calcium     8.4         Chloride     110         CO2     21         CODING SYSTEM   [id number]  [P]              [id number]  [P]              [id number]  [P]           Color, UA Colorless             CPK   41           Creatinine     0.8         Crossmatch Interpretation   Compatible  [P]              Compatible  [P]              Compatible  [P]           Differential Method     Automated         DISPENSE STATUS   ISSUED  [P]              CROSSMATCHED  [P]              CROSSMATCHED  [P]           eGFR     >60         Eos #     0.0         Eosinophil %     0.5         Ferritin   <1           Glucose     104         Glucose, UA Negative             Gran # (ANC)     5.6         Gran %     76.8         Group & Rh   O POS           HCG Quant     <1.2  Comment: Quantitative HCG Reference Ranges:  Males........................<5.0 mIU/mL  Non-Pregnant Females.........<5.0 mIU/mL  Pregnancy:  Weeks post-LMP...............Range (mIU/mL)  1-10  weeks.....................202-231,000  11-15 weeks..................22,536-234,990  16-22 weeks...................8,007-50,064  23-40 weeks...................1,600-49,413    NOTE:  This assay is not FDA approved for tumor screening,   diagnosis, or monitoring.           Hematocrit     6.4  Comment: H&H critical result(s) called and verbal readback obtained from   SANDOR SCHILLING RN by [facility] [place] 08/12/2023 12:49           Hemoglobin   1.7  Comment: HGB critical result(s) called and verbal readback obtained from   BLANCA PACK by [facility] [place] 08/12/2023 13:31     1.6  Comment: H&H critical result(s) called and verbal readback obtained from   SANDOR SCHILLING RN by [facility] [place] 08/12/2023 12:49           Hepatitis C  Ab       Negative       HEP C Virus Hold Specimen       Hold for HCV sendout       HIV 1/2 Ag/Ab       Negative       Hyaline Casts, UA 0             Hypo     Marked         Immature Grans (Abs)     0.06  Comment: Mild elevation in immature granulocytes is non specific and   can be seen in a variety of conditions including stress response,   acute inflammation, trauma and pregnancy. Correlation with other   laboratory and clinical findings is essential.           Immature Granulocytes     0.8         INDIRECT NA   NEG           Ketones, UA Negative             LD   131  Comment: Results are increased in hemolyzed samples.           Leukocytes, UA 1+             Lymph #     1.1         Lymph %     15.1         MCH     16.0         MCHC     25.0         MCV     64         Microscopic Comment SEE COMMENT  Comment: Other formed elements not mentioned in the report are not   present in the microscopic examination.                Mono #     0.5         Mono %     6.8         MPV     10.5         NITRITE UA Negative             nRBC     0         Occult Blood UA 3+             Ovalocytes     Moderate         pH, UA 7.0             Platelet Estimate     Appears normal         Platelets     121         Poikilocytosis     Marked         Potassium     3.5         Product Code   [id number]  [P]              [id number]  [P]              [id number]  [P]           PROTEIN TOTAL     6.1         Protein, UA 2+  Comment: Recommend a 24 hour urine protein or a urine   protein/creatinine ratio if globulin induced proteinuria is  clinically suspected.               RBC     1.00         RBC, UA >100             RDW     23.2         Retic   1.9           Sodium     140         Specific [place], UA 1.010             Specimen Outdate   08/15/2023 23:59           Specimen UA Urine, Clean Catch             Squam Epithel, UA 1             Stomatocytes     Present         Target Cells     Occasional         Teardrop Cells     Occasional          Unclass [place] UA Many             UNIT NUMBER   [id number]  [P]              [id number]  [P]              [id number]  [P]           UROBILINOGEN UA Negative             WBC Clumps, UA Few             WBC, UA 3             WBC     7.34                  [P] - Preliminary Result             I have personallly reviewed all pertinent lab results from the last 24 hours.    Diagnostic Results:  Pelvic sono pending    Assessment/Plan:     Renal/  Menorrhagia with regular cycle  08/12/2023   Currently no longer bleeding  Agree with blood transfusion  Pelvic sono results pending  Pt agrees to follow up with me in gyn clinic        Thank you for your consult. I will follow-up with patient. Please contact us if you have any additional questions.    Alba Tobin MD  Obstetrics & Gynecology  [facility] - Emergency Dept.

## 2023-08-13 PROBLEM — D25.0 FIBROIDS, SUBMUCOSAL: Status: ACTIVE | Noted: 2023-08-13

## 2023-08-13 LAB
ALBUMIN SERPL BCP-MCNC: 3.2 G/DL (ref 3.5–5.2)
ANION GAP SERPL CALC-SCNC: 10 MMOL/L (ref 8–16)
ANISOCYTOSIS BLD QL SMEAR: ABNORMAL
APTT PPP: <21 SEC (ref 21–32)
BASOPHILS # BLD AUTO: 0.03 K/UL (ref 0–0.2)
BASOPHILS # BLD AUTO: 0.04 K/UL (ref 0–0.2)
BASOPHILS NFR BLD: 0.4 % (ref 0–1.9)
BASOPHILS NFR BLD: 0.5 % (ref 0–1.9)
BLD PROD TYP BPU: NORMAL
BLD PROD TYP BPU: NORMAL
BLOOD UNIT EXPIRATION DATE: NORMAL
BLOOD UNIT EXPIRATION DATE: NORMAL
BLOOD UNIT TYPE CODE: 5100
BLOOD UNIT TYPE CODE: 5100
BLOOD UNIT TYPE: NORMAL
BLOOD UNIT TYPE: NORMAL
BUN SERPL-MCNC: 4 MG/DL (ref 6–20)
CALCIUM SERPL-MCNC: 8.2 MG/DL (ref 8.7–10.5)
CHLORIDE SERPL-SCNC: 108 MMOL/L (ref 95–110)
CO2 SERPL-SCNC: 21 MMOL/L (ref 23–29)
CODING SYSTEM: NORMAL
CODING SYSTEM: NORMAL
CREAT SERPL-MCNC: 0.7 MG/DL (ref 0.5–1.4)
CROSSMATCH INTERPRETATION: NORMAL
CROSSMATCH INTERPRETATION: NORMAL
DACRYOCYTES BLD QL SMEAR: ABNORMAL
DAT IGG-SP REAG RBC-IMP: NORMAL
DIFFERENTIAL METHOD: ABNORMAL
DIFFERENTIAL METHOD: ABNORMAL
DISPENSE STATUS: NORMAL
DISPENSE STATUS: NORMAL
EOSINOPHIL # BLD AUTO: 0.1 K/UL (ref 0–0.5)
EOSINOPHIL # BLD AUTO: 0.2 K/UL (ref 0–0.5)
EOSINOPHIL NFR BLD: 1 % (ref 0–8)
EOSINOPHIL NFR BLD: 2.2 % (ref 0–8)
ERYTHROCYTE [DISTWIDTH] IN BLOOD BY AUTOMATED COUNT: 19.2 % (ref 11.5–14.5)
ERYTHROCYTE [DISTWIDTH] IN BLOOD BY AUTOMATED COUNT: 22.5 % (ref 11.5–14.5)
EST. GFR  (NO RACE VARIABLE): >60 ML/MIN/1.73 M^2
FIBRINOGEN PPP-MCNC: 218 MG/DL (ref 182–400)
GLUCOSE SERPL-MCNC: 90 MG/DL (ref 70–110)
HCT VFR BLD AUTO: 16 % (ref 37–48.5)
HCT VFR BLD AUTO: 20.4 % (ref 37–48.5)
HGB BLD-MCNC: 5 G/DL (ref 12–16)
HGB BLD-MCNC: 6.4 G/DL (ref 12–16)
HYPOCHROMIA BLD QL SMEAR: ABNORMAL
IMM GRANULOCYTES # BLD AUTO: 0.12 K/UL (ref 0–0.04)
IMM GRANULOCYTES # BLD AUTO: 0.13 K/UL (ref 0–0.04)
IMM GRANULOCYTES NFR BLD AUTO: 1.6 % (ref 0–0.5)
IMM GRANULOCYTES NFR BLD AUTO: 1.6 % (ref 0–0.5)
INR PPP: 0.9 (ref 0.8–1.2)
LYMPHOCYTES # BLD AUTO: 1 K/UL (ref 1–4.8)
LYMPHOCYTES # BLD AUTO: 1.9 K/UL (ref 1–4.8)
LYMPHOCYTES NFR BLD: 13.3 % (ref 18–48)
LYMPHOCYTES NFR BLD: 24 % (ref 18–48)
MAGNESIUM SERPL-MCNC: 2.2 MG/DL (ref 1.6–2.6)
MCH RBC QN AUTO: 24.4 PG (ref 27–31)
MCH RBC QN AUTO: 24.7 PG (ref 27–31)
MCHC RBC AUTO-ENTMCNC: 31.3 G/DL (ref 32–36)
MCHC RBC AUTO-ENTMCNC: 31.4 G/DL (ref 32–36)
MCV RBC AUTO: 78 FL (ref 82–98)
MCV RBC AUTO: 79 FL (ref 82–98)
MONOCYTES # BLD AUTO: 0.5 K/UL (ref 0.3–1)
MONOCYTES # BLD AUTO: 0.8 K/UL (ref 0.3–1)
MONOCYTES NFR BLD: 6.8 % (ref 4–15)
MONOCYTES NFR BLD: 9.3 % (ref 4–15)
NEUTROPHILS # BLD AUTO: 5.2 K/UL (ref 1.8–7.7)
NEUTROPHILS # BLD AUTO: 5.6 K/UL (ref 1.8–7.7)
NEUTROPHILS NFR BLD: 63.7 % (ref 38–73)
NEUTROPHILS NFR BLD: 75.6 % (ref 38–73)
NRBC BLD-RTO: 0 /100 WBC
NRBC BLD-RTO: 0 /100 WBC
NUM UNITS TRANS PACKED RBC: NORMAL
NUM UNITS TRANS PACKED RBC: NORMAL
OVALOCYTES BLD QL SMEAR: ABNORMAL
PHOSPHATE SERPL-MCNC: 3.4 MG/DL (ref 2.7–4.5)
PLATELET # BLD AUTO: 118 K/UL (ref 150–450)
PLATELET # BLD AUTO: 131 K/UL (ref 150–450)
PLATELET BLD QL SMEAR: ABNORMAL
PMV BLD AUTO: 10 FL (ref 9.2–12.9)
PMV BLD AUTO: 10.5 FL (ref 9.2–12.9)
POIKILOCYTOSIS BLD QL SMEAR: ABNORMAL
POTASSIUM SERPL-SCNC: 4 MMOL/L (ref 3.5–5.1)
PROTHROMBIN TIME: 10.6 SEC (ref 9–12.5)
RBC # BLD AUTO: 2.05 M/UL (ref 4–5.4)
RBC # BLD AUTO: 2.59 M/UL (ref 4–5.4)
SCHISTOCYTES BLD QL SMEAR: PRESENT
SODIUM SERPL-SCNC: 139 MMOL/L (ref 136–145)
TARGETS BLD QL SMEAR: ABNORMAL
WBC # BLD AUTO: 7.35 K/UL (ref 3.9–12.7)
WBC # BLD AUTO: 8.09 K/UL (ref 3.9–12.7)

## 2023-08-13 PROCEDURE — 36415 COLL VENOUS BLD VENIPUNCTURE: CPT | Performed by: SPECIALIST

## 2023-08-13 PROCEDURE — 63600175 PHARM REV CODE 636 W HCPCS: Performed by: NURSE PRACTITIONER

## 2023-08-13 PROCEDURE — 80069 RENAL FUNCTION PANEL: CPT | Performed by: NURSE PRACTITIONER

## 2023-08-13 PROCEDURE — 25000003 PHARM REV CODE 250: Performed by: NURSE PRACTITIONER

## 2023-08-13 PROCEDURE — 85610 PROTHROMBIN TIME: CPT | Performed by: INTERNAL MEDICINE

## 2023-08-13 PROCEDURE — 85730 THROMBOPLASTIN TIME PARTIAL: CPT | Performed by: INTERNAL MEDICINE

## 2023-08-13 PROCEDURE — 99231 PR SUBSEQUENT HOSPITAL CARE,LEVL I: ICD-10-PCS | Mod: GT,,, | Performed by: OBSTETRICS & GYNECOLOGY

## 2023-08-13 PROCEDURE — 25000003 PHARM REV CODE 250: Performed by: OBSTETRICS & GYNECOLOGY

## 2023-08-13 PROCEDURE — 86880 COOMBS TEST DIRECT: CPT | Performed by: SPECIALIST

## 2023-08-13 PROCEDURE — 99223 1ST HOSP IP/OBS HIGH 75: CPT | Mod: ,,, | Performed by: INTERNAL MEDICINE

## 2023-08-13 PROCEDURE — 85384 FIBRINOGEN ACTIVITY: CPT | Performed by: INTERNAL MEDICINE

## 2023-08-13 PROCEDURE — G0378 HOSPITAL OBSERVATION PER HR: HCPCS

## 2023-08-13 PROCEDURE — 83735 ASSAY OF MAGNESIUM: CPT | Performed by: NURSE PRACTITIONER

## 2023-08-13 PROCEDURE — 85025 COMPLETE CBC W/AUTO DIFF WBC: CPT | Performed by: SPECIALIST

## 2023-08-13 PROCEDURE — 85246 CLOT FACTOR VIII VW ANTIGEN: CPT | Performed by: INTERNAL MEDICINE

## 2023-08-13 PROCEDURE — 85240 CLOT FACTOR VIII AHG 1 STAGE: CPT | Performed by: INTERNAL MEDICINE

## 2023-08-13 PROCEDURE — 96374 THER/PROPH/DIAG INJ IV PUSH: CPT

## 2023-08-13 PROCEDURE — 11000001 HC ACUTE MED/SURG PRIVATE ROOM

## 2023-08-13 PROCEDURE — 36415 COLL VENOUS BLD VENIPUNCTURE: CPT | Performed by: NURSE PRACTITIONER

## 2023-08-13 PROCEDURE — 99231 SBSQ HOSP IP/OBS SF/LOW 25: CPT | Mod: GT,,, | Performed by: OBSTETRICS & GYNECOLOGY

## 2023-08-13 PROCEDURE — 25000003 PHARM REV CODE 250: Performed by: SPECIALIST

## 2023-08-13 PROCEDURE — 99223 PR INITIAL HOSPITAL CARE,LEVL III: ICD-10-PCS | Mod: ,,, | Performed by: INTERNAL MEDICINE

## 2023-08-13 PROCEDURE — 36415 COLL VENOUS BLD VENIPUNCTURE: CPT | Performed by: INTERNAL MEDICINE

## 2023-08-13 PROCEDURE — P9016 RBC LEUKOCYTES REDUCED: HCPCS | Performed by: NURSE PRACTITIONER

## 2023-08-13 RX ORDER — HYDROCODONE BITARTRATE AND ACETAMINOPHEN 500; 5 MG/1; MG/1
TABLET ORAL
Status: DISCONTINUED | OUTPATIENT
Start: 2023-08-13 | End: 2023-08-14 | Stop reason: HOSPADM

## 2023-08-13 RX ORDER — MUPIROCIN 20 MG/G
OINTMENT TOPICAL 2 TIMES DAILY
Status: DISCONTINUED | OUTPATIENT
Start: 2023-08-13 | End: 2023-08-14 | Stop reason: HOSPADM

## 2023-08-13 RX ORDER — MEDROXYPROGESTERONE ACETATE 5 MG/1
10 TABLET ORAL 2 TIMES DAILY
Status: DISCONTINUED | OUTPATIENT
Start: 2023-08-13 | End: 2023-08-14 | Stop reason: HOSPADM

## 2023-08-13 RX ORDER — FOLIC ACID 1 MG/1
1 TABLET ORAL DAILY
Status: DISCONTINUED | OUTPATIENT
Start: 2023-08-13 | End: 2023-08-14 | Stop reason: HOSPADM

## 2023-08-13 RX ORDER — ACETAMINOPHEN 325 MG/1
650 TABLET ORAL EVERY 6 HOURS PRN
Status: DISCONTINUED | OUTPATIENT
Start: 2023-08-13 | End: 2023-08-14 | Stop reason: HOSPADM

## 2023-08-13 RX ADMIN — MUPIROCIN: 20 OINTMENT TOPICAL at 08:08

## 2023-08-13 RX ADMIN — MEDROXYPROGESTERONE ACETATE 10 MG: 5 TABLET ORAL at 11:08

## 2023-08-13 RX ADMIN — MEDROXYPROGESTERONE ACETATE 10 MG: 5 TABLET ORAL at 09:08

## 2023-08-13 RX ADMIN — IRON SUCROSE 200 MG: 20 INJECTION, SOLUTION INTRAVENOUS at 11:08

## 2023-08-13 RX ADMIN — FOLIC ACID 1 MG: 1 TABLET ORAL at 11:08

## 2023-08-13 RX ADMIN — ACETAMINOPHEN 650 MG: 325 TABLET ORAL at 11:08

## 2023-08-13 RX ADMIN — MUPIROCIN: 20 OINTMENT TOPICAL at 09:08

## 2023-08-13 NOTE — SUBJECTIVE & OBJECTIVE
History reviewed. No pertinent past medical history.    No past surgical history on file.    Review of patient's allergies indicates:  No Known Allergies    Family History    None       Tobacco Use    Smoking status: Not on file    Smokeless tobacco: Not on file   Substance and Sexual Activity    Alcohol use: Not on file    Drug use: Not on file    Sexual activity: Not on file         Review of Systems   Constitutional:  Positive for fatigue. Negative for chills and fever.        PICA- baking flour, ice    HENT:  Negative for congestion and sore throat.    Eyes:  Negative for photophobia and visual disturbance.   Respiratory:  Positive for shortness of breath. Negative for cough and wheezing.    Cardiovascular:  Negative for chest pain and leg swelling.   Gastrointestinal:  Negative for nausea and vomiting.   Endocrine: Negative for polydipsia, polyphagia and polyuria.   Genitourinary:  Negative for difficulty urinating, dysuria, menstrual problem, pelvic pain and vaginal pain.   Musculoskeletal:  Negative for arthralgias and back pain.   Neurological:  Positive for dizziness and light-headedness. Negative for syncope.   Psychiatric/Behavioral:  Negative for sleep disturbance. The patient is not nervous/anxious.      Objective:     Vital Signs (Most Recent):  Temp: 98.2 °F (36.8 °C) (08/12/23 1845)  Pulse: 73 (08/12/23 1930)  Resp: 16 (08/12/23 1930)  BP: 120/64 (08/12/23 1930)  SpO2: 100 % (08/12/23 1930) Vital Signs (24h Range):  Temp:  [97.6 °F (36.4 °C)-98.8 °F (37.1 °C)] 98.2 °F (36.8 °C)  Pulse:  [] 73  Resp:  [9-20] 16  SpO2:  [98 %-100 %] 100 %  BP: (111-133)/(56-64) 120/64     Weight: 100 kg (220 lb 9.1 oz)  There is no height or weight on file to calculate BMI.    No intake or output data in the 24 hours ending 08/12/23 1952     Physical Exam  Vitals reviewed.   Constitutional:       General: She is not in acute distress.     Appearance: She is overweight. She is not toxic-appearing.   HENT:       Head: Normocephalic and atraumatic.      Mouth/Throat:      Mouth: Mucous membranes are moist.      Pharynx: Oropharynx is clear.   Eyes:      Extraocular Movements: Extraocular movements intact.      Conjunctiva/sclera: Conjunctivae normal.   Cardiovascular:      Rate and Rhythm: Normal rate and regular rhythm.      Pulses: Normal pulses.      Heart sounds: No murmur heard.     Comments: Nail beds are pale.   Pulmonary:      Effort: Pulmonary effort is normal.      Breath sounds: No wheezing or rhonchi.   Abdominal:      General: Bowel sounds are normal.      Palpations: Abdomen is soft.   Musculoskeletal:         General: No deformity.      Cervical back: Normal range of motion and neck supple.      Right lower leg: No edema.      Left lower leg: No edema.   Skin:     General: Skin is warm and dry.   Neurological:      General: No focal deficit present.      Mental Status: She is alert and oriented to person, place, and time.   Psychiatric:         Mood and Affect: Mood normal.         Behavior: Behavior is cooperative.         Thought Content: Thought content normal.         Lines/Drains/Airways       Peripheral Intravenous Line  Duration                  Peripheral IV - Single Lumen 08/12/23 1223 20 G Right Antecubital <1 day         Peripheral IV - Single Lumen 08/12/23 1230 20 G Right Antecubital <1 day         Peripheral IV - Single Lumen 08/12/23 1318 18 G Anterior;Left Forearm <1 day                    Significant Labs:    CBC/Anemia Profile:  Recent Labs   Lab 08/12/23  1224 08/12/23  1307   WBC 7.34  --    HGB 1.6* 1.7*   HCT 6.4*  --    *  --    MCV 64*  --    RDW 23.2*  --    FERRITIN  --  <1*   RETIC  --  1.9        Chemistries:  Recent Labs   Lab 08/12/23  1224      K 3.5      CO2 21*   BUN 4*   CREATININE 0.8   CALCIUM 8.4*   ALBUMIN 3.5   PROT 6.1   BILITOT 0.2   ALKPHOS 35*   ALT <5*   AST 7*       All pertinent labs within the past 24 hours have been reviewed.    Significant  Imaging:   I have reviewed all pertinent imaging results/findings within the past 24 hours.

## 2023-08-13 NOTE — PROGRESS NOTES
[facility] - Intensive Care [facility])  Obstetrics & Gynecology  Progress Note    Patient Name: Crista Momin  MRN: [id number]  Admission Date: 8/12/2023  Primary Care Provider: No primary care provider on file.  Principal Problem: Symptomatic anemia    Subjective:     HPI:  Asked to see patient for history of heavy cycles  34 y/o g0 reports heavy menses all her life.  Reports never seen by gyn and does not have a pcp.  In ER today for dizziness x 10d .  Found to have a h/h of 1.6/6    Patient reports menses started age 10; describes cycles as heavy for her entire life.  Menses usually monthly, flow 7 days, heavy for 3 days; using overnight pads, change q 3-4 hr;   Does not need to double up; dysmenorrhea controlled with aleve.  Most recent menses has lasted 13 d and only having occasional spotting.  Reports this menses was  Heavy for 5 days.    +sexually active, male partners, last intercourse 3.5 yrs ago, +condoms      Interval History: Patient reports vaginal bleeding started again last night;   Scheduled Meds:   folic acid  1 mg Oral Daily    IRON SUCROSE IV ORDERABLE  200 mg Intravenous Daily    medroxyPROGESTERone  10 mg Oral BID    mupirocin   Nasal BID     Continuous Infusions:  PRN Meds:0.9%  NaCl infusion (for blood administration), 0.9%  NaCl infusion (for blood administration), sodium chloride 0.9%    Review of patient's allergies indicates:  No Known Allergies    Objective:     Vital Signs (Most Recent):  Temp: 98.2 °F (36.8 °C) (08/13/23 1109)  Pulse: 72 (08/13/23 1109)  Resp: 20 (08/13/23 1109)  BP: 129/74 (08/13/23 1109)  SpO2: 100 % (08/13/23 1109) Vital Signs (24h Range):  Temp:  [97.6 °F (36.4 °C)-98.8 °F (37.1 °C)] 98.2 °F (36.8 °C)  Pulse:  [] 72  Resp:  [9-23] 20  SpO2:  [98 %-100 %] 100 %  BP: (111-137)/(56-85) 129/74     Weight: 103 kg (227 lb 1.2 oz)  Body mass index is 40.22 kg/m².  Patient's last menstrual period was 07/30/2023 (exact date).    I&O (Last  24H):    Intake/Output Summary (Last 24 hours) at 8/13/2023 1143  Last data filed at 8/13/2023 0800  Gross per 24 hour   Intake 1204 ml   Output --   Net 1204 ml         Laboratory:  Recent Lab Results  (Last 5 results in the past 24 hours)        08/13/23  0944   08/13/23  0640   08/12/23  2339   08/12/23  1458   08/12/23  1307        Unit Blood Type Code         5100  [P]                5100  [P]                5100                5100                5100       Unit Expiration         [id number]  [P]                [id number]  [P]                [id number]       Unit Blood Type         O POS  [P]                O POS  [P]                O POS                O POS                O POS       Albumin   3.2             Anion Gap   10             Aniso     Marked           Appearance, UA       Hazy         aPTT <21.0  Comment: Refer to local heparin nomogram for intensity/dose specific   therapeutic   range.                 Bacteria, UA       Rare         Baso #   0.04   0.03           Basophil %   0.5   0.4           Bilirubin (UA)       Negative         BUN   4             Calcium   8.2             Chloride   108             CO2   21             CODING SYSTEM         JQVQ783  [P]                OMEQ571  [P]                TJMB496                IDYG217                BPTO852       Color, UA       Colorless         CPK         41       Creatinine   0.7             Crossmatch Interpretation         Compatible  [P]                Compatible  [P]                Compatible                Compatible                Compatible       Differential Method   Automated   Automated           DISPENSE STATUS         ISSUED  [P]                ISSUED  [P]                TRANSFUSED                TRANSFUSED                TRANSFUSED       eGFR   >60             Eos #   0.2   0.1           Eosinophil %   2.2   1.0           Ferritin         <1       Fibrinogen 218                Folate         10.1       Glucose   90             Glucose, UA       Negative         Gran # (ANC)   5.6   5.2           Gran %   75.6   63.7           Group & Rh         O POS       Hematocrit   20.4   16.0  Comment: Results confirmed, test repeated  H & H result(s) called and verbal readback obtained from Jena Levine   RN by [facility] 08/13/2023 01:18             Hemoglobin   6.4   5.0  Comment: Results confirmed, test repeated  H & H result(s) called and verbal readback obtained from April Abner   RN by [facility] 08/13/2023 01:18       1.7  Comment: HGB critical result(s) called and verbal readback obtained from   BLANCA PACK by [facility] 08/12/2023 13:31         Hyaline Casts, UA       0         Hypo     Marked           Immature Grans (Abs)   0.12  Comment: Mild elevation in immature granulocytes is non specific and   can be seen in a variety of conditions including stress response,   acute inflammation, trauma and pregnancy. Correlation with other   laboratory and clinical findings is essential.     0.13  Comment: Mild elevation in immature granulocytes is non specific and   can be seen in a variety of conditions including stress response,   acute inflammation, trauma and pregnancy. Correlation with other   laboratory and clinical findings is essential.             Immature Granulocytes   1.6   1.6           INDIRECT NA         NEG       INR 0.9  Comment: Coumadin Therapy:  2.0 - 3.0 for INR for all indicators except mechanical heart valves  and antiphospholipid syndromes which should use 2.5 - 3.5.                 Iron         11       Ketones, UA       Negative         LD         131  Comment: Results are increased in hemolyzed samples.       Leukocytes, UA       1+         Lymph #   1.0   1.9           Lymph %   13.3   24.0           Magnesium   2.2             MCH   24.7   24.4  Comment: Results confirmed, test repeated           MCHC   31.4   31.3  Comment: Results confirmed, test repeated           MCV   79    78  Comment: Results confirmed, test repeated           Microscopic Comment       SEE COMMENT  Comment: Other formed elements not mentioned in the report are not   present in the microscopic examination.            Mono #   0.5   0.8           Mono %   6.8   9.3           MPV   10.0   10.5           NITRITE UA       Negative         nRBC   0   0           Occult Blood UA       3+         Ovalocytes     Moderate           pH, UA       7.0         Phosphorus   3.4             Platelet Estimate     Appears normal           Platelets   118   131           Poikilocytosis     Marked           Potassium   4.0             Product Code         [id number]  [P]                [id number]  [P]                [id number]                M1559C53                D0078L65       Protein, UA       2+  Comment: Recommend a 24 hour urine protein or a urine   protein/creatinine ratio if globulin induced proteinuria is  clinically suspected.           Protime 10.6               RBC   2.59   2.05  Comment: Results confirmed, test repeated           RBC, UA       >100         RDW   19.2   22.5           Retic         1.9       Saturated Iron         2       Schistocytes     Present           Sodium   139             Specific [place], UA       1.010         Specimen Outdate         08/15/2023 23:59       Specimen UA       Urine, Clean Catch         Squam Epithel, UA       1         Target Cells     Occasional           Teardrop Cells     Occasional           TIBC         522       Transferrin         353       Unclass [place] UA       Many         UNIT NUMBER         [id number]  [P]                [id number]  [P]                [id number]                [id number]                [id number]       UROBILINOGEN UA       Negative         Vitamin B-12         534       WBC Clumps, UA       Few         WBC, UA       3         WBC   7.35   8.09                                   [P] - Preliminary Result             I have personallly reviewed  all pertinent lab results from the last 24 hours.    Diagnostic Results:  Labs: Reviewed  US: Reviewed     Physical Exam:   Constitutional: She appears well-developed.     Eyes: Pupils are equal, round, and reactive to light. Conjunctivae and EOM are normal.      Pulmonary/Chest: Effort normal. Right breast exhibits no mass, no nipple discharge, no skin change, no tenderness and presence. Left breast exhibits no mass, no nipple discharge, no skin change, no tenderness and presence. Breasts are symmetrical.        Abdominal: Soft.     Genitourinary:    Pelvic exam was performed with patient supine.      Genitourinary Comments: deferred             Musculoskeletal: Normal range of motion.       Neurological: She is alert.    Skin: Skin is warm.    Psychiatric: She has a normal mood and affect.        Review of Systems   Genitourinary:  Positive for menorrhagia and menstrual problem.   All other systems reviewed and are negative.        Assessment/Plan:     Fibroids, submucosal  08/13/2023  Reviewed sono with pt--10 wk size uterus  Submucous fibrod--4 cm  Discussed with patient amenable to myosure or myomectomy    Iron deficiency anemia due to chronic blood loss  08/13/2023  S/p 5 units prbc; hgb 6.4      Menorrhagia with regular cycle  08/13/2023   Currently no longer bleeding  Agree with blood transfusion  Pelvic sono results pending  Pt agrees to follow up with me in gyn clinic  08/13/2023  Patient reports bleeding resumed last night  Provera added 10mg bid  Continue oral iron  hgb now 6.1  If, bleeding does not respond to provera, could consider Uterine artery embolization or surgery--myosure or myomectomy        Alba Tobin MD  Obstetrics & Gynecology  Former[facility] - Intensive Care ([facility])

## 2023-08-13 NOTE — ASSESSMENT & PLAN NOTE
H/H 1.7/6.4; has been associated with dizziness, lightheadedness, and SOB + PICA  Anemia work up in progress   Hematology and GYN consulted   Typed and screened for 3 units PRBC   Hemodynamics are stable   Follow up post-transfusion CBC

## 2023-08-13 NOTE — PLAN OF CARE
Pt admitted from ED last night with critical Hgb of 1.6. Pt asymptomatic on arrival, VSS and ambulated to bed herself. Pt currently receiving 5th unit of PRBC then a recheck CBC will be collected. She ambulated to bedside commode multiple times during shift. She is steady on her feet and denies dizziness and SOB. Pt still having some menstrual bleeding with clots but has only soaked 1 pad since arrival to hospital. Tolerating blood transfusions well and is currently sleeping/snoring. Safety measures adhered to and call light and personal items are within reach.

## 2023-08-13 NOTE — ASSESSMENT & PLAN NOTE
08/13/2023   Currently no longer bleeding  Agree with blood transfusion  Pelvic sono results pending  Pt agrees to follow up with me in gyn clinic  08/13/2023  Patient reports bleeding resumed last night  Provera added 10mg bid  Continue oral iron  hgb now 6.1  If, bleeding does not respond to provera, could consider Uterine artery embolization or surgery--myosure or myomectomy

## 2023-08-13 NOTE — PLAN OF CARE
[facility] - Intensive Care (Hospital)  Initial Discharge Assessment       Primary Care Provider: No primary care provider on file.    Admission Diagnosis: Severe anemia [D64.9]  Symptomatic anemia [D64.9]    Admission Date: 8/12/2023  Expected Discharge Date:     Transition of Care Barriers: (P) Nursing Home rejection    Payor: [facility] / Plan: [facility] CHOICE PLUS / Product Type: Commercial /     Extended Emergency Contact Information  Primary Emergency Contact: Shields,Thelma  Mobile Phone: [phone]  Relation: Sister  Preferred language: English   needed? No    Discharge Plan A: (P) Home with family       No Pharmacies Listed    Initial Assessment (most recent)       Adult Discharge Assessment - 08/13/23 1121          Discharge Assessment    Assessment Type Discharge Planning Assessment     Confirmed/corrected address, phone number and insurance Yes     Confirmed Demographics Correct on Facesheet     Source of Information patient     When was your last doctors appointment? --   Patient doesn't have a PCP    Reason For Admission Symptomatic anemia (P)      People in Home sibling(s) (P)      Do you expect to return to your current living situation? Yes (P)      Do you have help at home or someone to help you manage your care at home? No (P)      Prior to hospitilization cognitive status: Alert/Oriented (P)      Current cognitive status: Alert/Oriented (P)      Home Accessibility wheelchair accessible (P)      Home Layout Able to live on 1st floor (P)      Equipment Currently Used at Home none (P)      Readmission within 30 days? No (P)      Patient currently being followed by outpatient case management? No (P)      Do you currently have service(s) that help you manage your care at home? No (P)      Do you take prescription medications? No (P)      Do you have prescription coverage? Yes (P)      Coverage [facility] CHOICE PLUS (P)      Do you have any problems affording any of your  prescribed medications? No (P)      Is the patient taking medications as prescribed? no     If no, which medications is patient not taking? Patient doesn't have a PCP     Who is going to help you get home at discharge? Thelma Padgett (Sister) [phone] (P)      How do you get to doctors appointments? other (see comments) (P)    Patient doesn't have a PCP    Are you on dialysis? No (P)      Do you take coumadin? No (P)      Discharge Plan A Home with family (P)      DME Needed Upon Discharge  none (P)      Transition of Care Barriers Nursing Home rejection (P)         Physical Activity    On average, how many days per week do you engage in moderate to strenuous exercise (like a brisk walk)? 5 days (P)      On average, how many minutes do you engage in exercise at this level? 60 min (P)         Financial Resource Strain    How hard is it for you to pay for the very basics like food, housing, medical care, and heating? Not hard at all (P)         Housing Stability    In the last 12 months, was there a time when you were not able to pay the mortgage or rent on time? No (P)      In the last 12 months, was there a time when you did not have a steady place to sleep or slept in a shelter (including now)? No (P)         Transportation Needs    In the past 12 months, has lack of transportation kept you from medical appointments or from getting medications? No (P)      In the past 12 months, has lack of transportation kept you from meetings, work, or from getting things needed for daily living? No (P)         Food Insecurity    Within the past 12 months, you worried that your food would run out before you got the money to buy more. Never true (P)      Within the past 12 months, the food you bought just didn't last and you didn't have money to get more. Never true (P)         Stress    Do you feel stress - tense, restless, nervous, or anxious, or unable to sleep at night because your mind is troubled all the time - these  days? To some extent (P)         Social Connections    In a typical week, how many times do you talk on the phone with family, friends, or neighbors? More than three times a week (P)      How often do you get together with friends or relatives? More than three times a week (P)      How often do you attend Oriental orthodox or Yarsanism services? Never (P)      Do you belong to any clubs or organizations such as Oriental orthodox groups, unions, fraternal or athletic groups, or school groups? Yes (P)      How often do you attend meetings of the clubs or organizations you belong to? Never (P)      Are you , , , , never , or living with a partner? Never  (P)         Alcohol Use    Q1: How often do you have a drink containing alcohol? Monthly or less (P)      Q2: How many drinks containing alcohol do you have on a typical day when you are drinking? 1 or 2 (P)      Q3: How often do you have six or more drinks on one occasion? Less than monthly (P)         OTHER    Name(s) of People in Home Thelmajeramie Padgett (Sister) [phone] (P)                       No needs identified at this time.     Bozena Graves LMSW 8/13/2023 11:27 AM

## 2023-08-13 NOTE — ASSESSMENT & PLAN NOTE
Reports regular menses with heavy blood flow 3-4 out of 7 days; however this month period now on day #13  with 5-7 heavy flow days   Pelvic US revealing fibroid  Has been started on Provera  If bleeding does not stop- options include uterine artery embolization vs surgery   Will follow-up with Dr. Tobin in clinic

## 2023-08-13 NOTE — ASSESSMENT & PLAN NOTE
Reports regular menses with heavy blood flow 3-4 out of 7 days  This month period was 12 days with 5-7 heavy flow days   GYN consulted   Pelvic US pending   Will need outpatient follow up

## 2023-08-13 NOTE — ASSESSMENT & PLAN NOTE
H/H 1.7/6.4; has been associated with dizziness, lightheadedness, and SOB + PICA  Labs consistent with severe SHANI  Additional work up in progress   S/p 5 units PRBC - tolerated transfusion well, hemodynamics acceptable   Hematology following   Daily CBC

## 2023-08-13 NOTE — NURSING
Report given to BLANCA Garcia. Transferring pt to [facility] room 570 via wheelchair with all personal belongings.  Pt will inform sister that she is moving rooms.

## 2023-08-13 NOTE — HOSPITAL COURSE
8/13: H/H 6.4/20.4 following 5 units PRBC. No issues during transfusions. Work up thus far showing severe SHANI, no coagulapathy. Pelvic US reviewed by GYN - fibroids. Starting provera. Patient reports ongoing menses, day #13- soiled 2 pads overnight and 1 today. Hemodynamics remain stable. No dizziness when getting up to toilet. Hematology evaluated and ordered additional labs. Stable for transfer out of ICU  Patient's hemoglobin joe to 6.4 after 5 units.  Her iron saturation was low and therefore she was given 2 doses of Venofer 200 mg IV.  She denies any symptomatic complaints and states she is feeling much better.  Patient seen and examined on day of discharge she is stable for discharge home.  She will follow up with gyn, remain on her Provera, follow up with PCP.

## 2023-08-13 NOTE — CONSULTS
[facility] - Intensive Care ([facility])  [facility] Medicine  Consult Note    Patient Name: Crista Momin  MRN: [id number]  Admission Date: 8/12/2023  Hospital Length of Stay: 1 days  Attending Physician: Catherine Cote DO   Primary Care Provider: No primary care provider on file.           Patient information was obtained from patient.     Consults  Subjective:     Principal Problem: Symptomatic anemia    Chief Complaint:   Chief Complaint   Patient presents with    Dizziness     X 10 days, states on her period for 10 days.         HPI: Information obtained from chart review and discussion with patient.      33-year-old female with no known medical history as she does not seek routine medical care who presented to ED 8/12/2023 for evaluation of dizziness. ED evaluation revealing H/H 1.7/6.4. Hemodynamics stable. She was typed and screen and 3 units PRBC were ordered. Patient noted heavy, extended menses this month. GYN and Hematology consulted. Critical care consulted for close hemodynamic monitoring during transfusion with critically low H/H.     Upon exam, Ms. Momin looks surprisingly non-toxic and comfortable. She has no known medical history as she has not seen any type of provider since her early 20s. She does not take any medications on a regular basis. She denies vaping, tobacco use, illicit drug use, and ETOH use. She has regular menstrual cycles that typically last 7 days with the first 3-4 days being heavier with clots. This month, her period lasted 12 days and was fairly heavy for about 7 of those days. She has been experiencing dizziness and lightheadedness, mostly when changing positions, for the past 10 days. She has been having intermittent SOB (after walking about 50 ft.) since May 2023. She used to eat baking flour but stopped about a year ago. In the past few months she has been eating a lot of ice. She denies syncope.     Hospital Course:   8/13: H/H 6.4/20.4 following 5 units PRBC. No issues  during transfusions. Work up thus far showing severe SHANI, no coagulapathy. Pelvic US reviewed by GYN - fibroids. Starting provera. Patient reports ongoing menses, day #13- soiled 2 pads overnight and 1 today. Hemodynamics remain stable. No dizziness when getting up to toilet. Hematology evaluated and ordered additional labs. Stable for transfer out of ICU    History reviewed. No pertinent past medical history.    No past surgical history on file.    Review of patient's allergies indicates:  No Known Allergies    No current facility-administered medications on file prior to encounter.     No current outpatient medications on file prior to encounter.     Family History    None       Tobacco Use    Smoking status: Not on file    Smokeless tobacco: Not on file   Substance and Sexual Activity    Alcohol use: Not on file    Drug use: Not on file    Sexual activity: Not on file     Review of Systems   Constitutional:  Negative for chills and fever.   HENT:  Negative for congestion and sore throat.    Eyes:  Negative for photophobia and visual disturbance.   Respiratory:  Negative for cough and shortness of breath.    Gastrointestinal:  Negative for nausea and vomiting.   Genitourinary:  Positive for vaginal bleeding. Negative for difficulty urinating and dysuria.   Musculoskeletal:  Negative for arthralgias and back pain.   Neurological:  Negative for dizziness and headaches.   Psychiatric/Behavioral:  Negative for sleep disturbance. The patient is not nervous/anxious.      Objective:     Vital Signs (Most Recent):  Temp: 98.2 °F (36.8 °C) (08/13/23 1109)  Pulse: 72 (08/13/23 1109)  Resp: 20 (08/13/23 1109)  BP: 129/74 (08/13/23 1109)  SpO2: 100 % (08/13/23 1109) Vital Signs (24h Range):  Temp:  [97.6 °F (36.4 °C)-98.6 °F (37 °C)] 98.2 °F (36.8 °C)  Pulse:  [] 72  Resp:  [10-23] 20  SpO2:  [100 %] 100 %  BP: (111-137)/(58-85) 129/74     Weight: 103 kg (227 lb 1.2 oz)  Body mass index is 40.22 kg/m².     Physical  Exam  Vitals reviewed.   HENT:      Head: Normocephalic and atraumatic.      Mouth/Throat:      Mouth: Mucous membranes are moist.      Pharynx: Oropharynx is clear.   Eyes:      Extraocular Movements: Extraocular movements intact.      Conjunctiva/sclera: Conjunctivae normal.   Cardiovascular:      Rate and Rhythm: Normal rate and regular rhythm.      Pulses: Normal pulses.      Comments: Nail beds pink  Pulmonary:      Effort: Pulmonary effort is normal.      Breath sounds: No wheezing or rhonchi.   Abdominal:      General: Bowel sounds are normal.      Palpations: Abdomen is soft.   Musculoskeletal:         General: No deformity.      Cervical back: Normal range of motion and neck supple.      Right lower leg: No edema.      Left lower leg: No edema.   Skin:     General: Skin is warm and dry.   Neurological:      General: No focal deficit present.      Mental Status: She is alert and oriented to person, place, and time.   Psychiatric:         Mood and Affect: Mood normal.         Thought Content: Thought content normal.          Significant Labs: All pertinent labs within the past 24 hours have been reviewed.    Significant Imaging: I have reviewed all pertinent imaging results/findings within the past 24 hours.    Assessment/Plan:     * Symptomatic anemia  H/H 1.7/6.4; has been associated with dizziness, lightheadedness, and SOB + PICA  Labs consistent with severe SHANI  Additional work up in progress   S/p 5 units PRBC - tolerated transfusion well, hemodynamics acceptable   Hematology following   Daily CBC    Iron deficiency anemia due to chronic blood loss  Labs consistent with severe SHANI   Initiated on IV iron 200 mg daily x 5 days   Folate   [place]on [place] Profile and hemoglobin electrophoresis pending  Heme following      Menorrhagia with regular cycle  Reports regular menses with heavy blood flow 3-4 out of 7 days; however this month period now on day #13  with 5-7 heavy flow days   Pelvic US revealing  fibroid  Has been started on Provera  If bleeding does not stop- options include uterine artery embolization vs surgery   Will follow-up with Dr. Tobin in clinic        VTE Risk Mitigation (From admission, onward)         Ordered     Place sequential compression device  Until discontinued         08/12/23 1618     IP VTE LOW RISK PATIENT  Once         08/12/23 1618              Hospital medicine team will assume primary management.     Iva Crook NP  Department of Hospital Medicine   [facility] - Intensive Care ([facility])

## 2023-08-13 NOTE — SUBJECTIVE & OBJECTIVE
Interval History: Patient reports vaginal bleeding started again last night;   Scheduled Meds:   folic acid  1 mg Oral Daily    IRON SUCROSE IV ORDERABLE  200 mg Intravenous Daily    medroxyPROGESTERone  10 mg Oral BID    mupirocin   Nasal BID     Continuous Infusions:  PRN Meds:0.9%  NaCl infusion (for blood administration), 0.9%  NaCl infusion (for blood administration), sodium chloride 0.9%    Review of patient's allergies indicates:  No Known Allergies    Objective:     Vital Signs (Most Recent):  Temp: 98.2 °F (36.8 °C) (08/13/23 1109)  Pulse: 72 (08/13/23 1109)  Resp: 20 (08/13/23 1109)  BP: 129/74 (08/13/23 1109)  SpO2: 100 % (08/13/23 1109) Vital Signs (24h Range):  Temp:  [97.6 °F (36.4 °C)-98.8 °F (37.1 °C)] 98.2 °F (36.8 °C)  Pulse:  [] 72  Resp:  [9-23] 20  SpO2:  [98 %-100 %] 100 %  BP: (111-137)/(56-85) 129/74     Weight: 103 kg (227 lb 1.2 oz)  Body mass index is 40.22 kg/m².  Patient's last menstrual period was 07/30/2023 (exact date).    I&O (Last 24H):    Intake/Output Summary (Last 24 hours) at 8/13/2023 1143  Last data filed at 8/13/2023 0800  Gross per 24 hour   Intake 1204 ml   Output --   Net 1204 ml         Laboratory:  Recent Lab Results  (Last 5 results in the past 24 hours)        08/13/23  0944   08/13/23  0640   08/12/23  2339   08/12/23  1458   08/12/23  1307        Unit Blood Type Code         5100  [P]                5100  [P]                5100                5100                5100       Unit Expiration         [id number]  [P]                [id number]  [P]                [id number]       Unit Blood Type         O POS  [P]                O POS  [P]                O POS                O POS                O POS       Albumin   3.2             Anion Gap   10             Aniso     Marked           Appearance, UA       Hazy         aPTT <21.0  Comment: Refer to local heparin nomogram for intensity/dose specific    therapeutic   range.                 Bacteria, UA       Rare         Baso #   0.04   0.03           Basophil %   0.5   0.4           Bilirubin (UA)       Negative         BUN   4             Calcium   8.2             Chloride   108             CO2   21             CODING SYSTEM         JEKU066  [P]                BOUH648  [P]                DPYG209                QSEW459                SVWB719       Color, UA       Colorless         CPK         41       Creatinine   0.7             Crossmatch Interpretation         Compatible  [P]                Compatible  [P]                Compatible                Compatible                Compatible       Differential Method   Automated   Automated           DISPENSE STATUS         ISSUED  [P]                ISSUED  [P]                TRANSFUSED                TRANSFUSED                TRANSFUSED       eGFR   >60             Eos #   0.2   0.1           Eosinophil %   2.2   1.0           Ferritin         <1       Fibrinogen 218               Folate         10.1       Glucose   90             Glucose, UA       Negative         Gran # (ANC)   5.6   5.2           Gran %   75.6   63.7           Group & Rh         O POS       Hematocrit   20.4   16.0  Comment: Results confirmed, test repeated  H & H result(s) called and verbal readback obtained from Jena Levine RN by [facility] 08/13/2023 01:18             Hemoglobin   6.4   5.0  Comment: Results confirmed, test repeated  H & H result(s) called and verbal readback obtained from Jena Levine   RN by Franny 08/13/2023 01:18       1.7  Comment: HGB critical result(s) called and verbal readback obtained from   BLANCA PACK by CAROLE 08/12/2023 13:31         Hyaline Casts, UA       0         Hypo     Marked           Immature Grans (Abs)   0.12  Comment: Mild elevation in immature granulocytes is non specific and   can be seen in a variety of conditions including stress response,   acute inflammation, trauma and pregnancy. Correlation with other    laboratory and clinical findings is essential.     0.13  Comment: Mild elevation in immature granulocytes is non specific and   can be seen in a variety of conditions including stress response,   acute inflammation, trauma and pregnancy. Correlation with other   laboratory and clinical findings is essential.             Immature Granulocytes   1.6   1.6           INDIRECT NA         NEG       INR 0.9  Comment: Coumadin Therapy:  2.0 - 3.0 for INR for all indicators except mechanical heart valves  and antiphospholipid syndromes which should use 2.5 - 3.5.                 Iron         11       Ketones, UA       Negative         LD         131  Comment: Results are increased in hemolyzed samples.       Leukocytes, UA       1+         Lymph #   1.0   1.9           Lymph %   13.3   24.0           Magnesium   2.2             MCH   24.7   24.4  Comment: Results confirmed, test repeated           MCHC   31.4   31.3  Comment: Results confirmed, test repeated           MCV   79   78  Comment: Results confirmed, test repeated           Microscopic Comment       SEE COMMENT  Comment: Other formed elements not mentioned in the report are not   present in the microscopic examination.            Mono #   0.5   0.8           Mono %   6.8   9.3           MPV   10.0   10.5           NITRITE UA       Negative         nRBC   0   0           Occult Blood UA       3+         Ovalocytes     Moderate           pH, UA       7.0         Phosphorus   3.4             Platelet Estimate     Appears normal           Platelets   118   131           Poikilocytosis     Marked           Potassium   4.0             Product Code         [id number]  [P]                [id number]  [P]                N7473U12                D3558M81                C9995C82       Protein, UA       2+  Comment: Recommend a 24 hour urine protein or a urine   protein/creatinine ratio if globulin induced proteinuria is  clinically suspected.           Protime 10.6                RBC   2.59   2.05  Comment: Results confirmed, test repeated           RBC, UA       >100         RDW   19.2   22.5           Retic         1.9       Saturated Iron         2       Schistocytes     Present           Sodium   139             Specific [place], UA       1.010         Specimen Outdate         08/15/2023 23:59       Specimen UA       Urine, Clean Catch         Squam Epithel, UA       1         Target Cells     Occasional           Teardrop Cells     Occasional           TIBC         522       Transferrin         353       Unclass [place] UA       Many         UNIT NUMBER         [id number]  [P]                [id number]  [P]                [id number]                [id number]                [id number]       UROBILINOGEN UA       Negative         Vitamin B-12         534       WBC Clumps, UA       Few         WBC, UA       3         WBC   7.35   8.09                                   [P] - Preliminary Result             I have personallly reviewed all pertinent lab results from the last 24 hours.    Diagnostic Results:  Labs: Reviewed  US: Reviewed     Physical Exam:   Constitutional: She appears well-developed.     Eyes: Pupils are equal, round, and reactive to light. Conjunctivae and EOM are normal.      Pulmonary/Chest: Effort normal. Right breast exhibits no mass, no nipple discharge, no skin change, no tenderness and presence. Left breast exhibits no mass, no nipple discharge, no skin change, no tenderness and presence. Breasts are symmetrical.        Abdominal: Soft.     Genitourinary:    Pelvic exam was performed with patient supine.      Genitourinary Comments: deferred             Musculoskeletal: Normal range of motion.       Neurological: She is alert.    Skin: Skin is warm.    Psychiatric: She has a normal mood and affect.        Review of Systems   Genitourinary:  Positive for menorrhagia and menstrual problem.   All other systems reviewed and are negative.

## 2023-08-13 NOTE — EICU
eICU Physician Brief Note    Chart reviewed. On camera, the patient is resting in bed, in NAD.  Mrs Crista Momin is a 33 year old lady presenting with dizziness over the last 10 days, and DORADO intermittently since 5/2023. Denies any PMH. She does report heavy, prolonged menses. Workup in the ED showed Hgb 1.6/Hct 6.4 (no prior labs).   Labs and investigations reviewed.  Current medications reviewed.  A/P:  Severe, likely subacute anemia given paucity of symptoms.  Transfuse PRBC to goal Hgb 7.  Reticulocyte numbers appear inadequately low.  Iron deficient.  Hematology consult in am.  Gyn consult in am (though the menorrhagia level the patient describes is less likely to cause such severe anemia).  GI/DVT prophylaxis.    eICU is available should acute issues arise.

## 2023-08-13 NOTE — CONSULTS
[facility] - Intensive Care ([facility])  Hematology/Oncology  Consult Note    Patient Name: Crista Momin  MRN: [id number]  Admission Date: 8/12/2023  Hospital Length of Stay: 1 days  Code Status: Full Code   Attending Provider: April Betancourt MD  Consulting Provider: Leyla Luque MD  Primary Care Physician: No primary care provider on file.  Principal Problem:Symptomatic anemia    Inpatient consult to Hematology  Consult performed by: Leyla Luque MD  Consult ordered by: Sean Reeder MD  Reason for consult: Anemia  Assessment/Recommendations:     Anemia:   Severe symptomatic anemia hemoglobin 1.6 in setting of menorrhagia.  No other atypical bleeding.  No prior medical/PCP/gynecologic care or known diagnoses.  Agree with blood transfusion thus far has received 5 units PRBC with improvement hemoglobin from 1.6 no 6.4.  Ferritin less than 1 consistent with severe iron deficiency.  Recommend IV iron therapy Venofer 200 mg daily for total 1000 mg for iron repletion.    Recommend folic acid supplementation 1 mg daily to augment reticulocytosis.    Recommend checking coagulation labs including PT PTT and fibrinogen to determine if coagulopathy/von Willebrand's disease evaluation  Outpatient hematology follow-up     Thrombocytopenia: Mild thrombocytopenia platelet count 1 100-150 K. likely consumptive in setting of significant menstrual cycle bleeding, recommend monitoring.      Menorrhagia:  Recommend gynecologic evaluation.  Follow-up of coagulation labs per above and additional transfusion if indicated.  Recommend discussion with gynecology/IR if local intervention for bleeding indicated.  Ultrasound showing leiomyomata.      Family history notable for sister passed of uterine cancer at 43 years old may consider genetics referral.          Subjective:     HPI:  Ms. Momin is a 33-year-old woman without routine medical care or known chronic conditions who presents to emergency room on  2023 with severe symptomatic anemia to hemoglobin 1.6 in setting of menorrhagia.  She notes menarche at age 10 with heavy menstrual cycles most recent current cycle with 5 days of significant bleeding vaginally.  She denies any other atypical bleeding including no melena hematochezia hemoptysis hematemesis epistaxis or atypical bruising.  No known family history or personal history of bleeding disorder.  Family history is notable for sister who  from uterine cancer at 43 years old.  No other 1st or second-degree relatives with malignancy.  She was transfused with 5 units PRBC improved hemoglobin to 6.4 on 2023.  Of note she is not on any blood thinners or over-the-counter medications aside from occasional multivitamin in the past.  She is not on iron supplementation and has never received oral IV iron therapy or blood transfusion prior to this incident.  She denies any pain including menstrual cramping.    Oncology Treatment Plan:   [No matching plan found]    Medications:  Continuous Infusions:  Scheduled Meds:   mupirocin   Nasal BID     PRN Meds:0.9%  NaCl infusion (for blood administration), 0.9%  NaCl infusion (for blood administration), sodium chloride 0.9%     Review of patient's allergies indicates:  No Known Allergies     History reviewed. No pertinent past medical history.  No past surgical history on file.  Family History    None       Tobacco Use    Smoking status: Not on file    Smokeless tobacco: Not on file   Substance and Sexual Activity    Alcohol use: Not on file    Drug use: Not on file    Sexual activity: Not on file       Review of Systems   Constitutional:  Positive for fatigue. Negative for activity change, appetite change, chills, diaphoresis, fever and unexpected weight change.   HENT:  Negative for congestion, rhinorrhea and sinus pain.    Respiratory:  Negative for cough, choking, chest tightness, shortness of breath, wheezing and stridor.    Cardiovascular:  Negative for  chest pain, palpitations and leg swelling.   Gastrointestinal:  Negative for abdominal distention, abdominal pain, anal bleeding, blood in stool, constipation, diarrhea, nausea, rectal pain and vomiting.   Genitourinary:  Positive for vaginal bleeding. Negative for decreased urine volume, difficulty urinating, dyspareunia, dysuria, enuresis, flank pain, frequency, genital sores, hematuria, menstrual problem, pelvic pain, urgency and vaginal pain.   Skin:  Negative for rash.   Hematological:  Does not bruise/bleed easily.   Psychiatric/Behavioral: Negative.       Objective:     Vital Signs (Most Recent):  Temp: 98 °F (36.7 °C) (08/13/23 0715)  Pulse: 69 (08/13/23 0800)  Resp: 18 (08/13/23 0800)  BP: 121/85 (08/13/23 0800)  SpO2: 100 % (08/13/23 0800) Vital Signs (24h Range):  Temp:  [97.6 °F (36.4 °C)-98.8 °F (37.1 °C)] 98 °F (36.7 °C)  Pulse:  [] 69  Resp:  [9-23] 18  SpO2:  [98 %-100 %] 100 %  BP: (111-137)/(56-85) 121/85     Weight: 103 kg (227 lb 1.2 oz)  Body mass index is 40.22 kg/m².  Body surface area is 2.14 meters squared.      Intake/Output Summary (Last 24 hours) at 8/13/2023 0903  Last data filed at 8/13/2023 0800  Gross per 24 hour   Intake 1204 ml   Output --   Net 1204 ml       Physical Exam  Vitals reviewed.   Constitutional:       General: She is not in acute distress.     Appearance: Normal appearance.   HENT:      Head: Normocephalic and atraumatic.      Nose: No congestion.      Mouth/Throat:      Mouth: Mucous membranes are moist.   Eyes:      Extraocular Movements: Extraocular movements intact.      Conjunctiva/sclera: Conjunctivae normal.   Cardiovascular:      Rate and Rhythm: Normal rate.   Pulmonary:      Effort: Pulmonary effort is normal. No respiratory distress.   Abdominal:      General: There is no distension.      Palpations: Abdomen is soft.   Musculoskeletal:         General: No swelling.      Cervical back: Neck supple.   Skin:     General: Skin is warm.      Coloration: Skin  is not jaundiced.   Neurological:      General: No focal deficit present.      Mental Status: She is alert and oriented to person, place, and time.   Psychiatric:         Mood and Affect: Mood normal.         Behavior: Behavior normal.         Thought Content: Thought content normal.         Judgment: Judgment normal.         Significant Labs:   CBC:   Recent Labs   Lab 08/12/23  1224 08/12/23  1307 08/12/23  2339 08/13/23  0640   WBC 7.34  --  8.09 7.35   HGB 1.6* 1.7* 5.0* 6.4*   HCT 6.4*  --  16.0* 20.4*   *  --  131* 118*    and CMP:   Recent Labs   Lab 08/12/23  1224 08/13/23  0640    139   K 3.5 4.0    108   CO2 21* 21*    90   BUN 4* 4*   CREATININE 0.8 0.7   CALCIUM 8.4* 8.2*   PROT 6.1  --    ALBUMIN 3.5 3.2*   BILITOT 0.2  --    ALKPHOS 35*  --    AST 7*  --    ALT <5*  --    ANIONGAP 9 10       Diagnostic Results:  I have reviewed all pertinent imaging results/findings within the past 24 hours.    Assessment/Plan:     Active Diagnoses:    Diagnosis Date Noted POA    PRINCIPAL PROBLEM:  Symptomatic anemia [D64.9] 08/12/2023 Yes    Menorrhagia with regular cycle [N92.0] 08/12/2023 Yes    Iron deficiency anemia due to chronic blood loss [D50.0] 08/12/2023 Yes      Problems Resolved During this Admission:       See above    Thank you for your consult. I will follow-up with patient. Please contact us if you have any additional questions.    Leyla Luque MD  Hematology/Oncology  '[facility] - Intensive Care ([facility])

## 2023-08-13 NOTE — SUBJECTIVE & OBJECTIVE
History reviewed. No pertinent past medical history.    No past surgical history on file.    Review of patient's allergies indicates:  No Known Allergies    No current facility-administered medications on file prior to encounter.     No current outpatient medications on file prior to encounter.     Family History    None       Tobacco Use    Smoking status: Not on file    Smokeless tobacco: Not on file   Substance and Sexual Activity    Alcohol use: Not on file    Drug use: Not on file    Sexual activity: Not on file     Review of Systems   Constitutional:  Negative for chills and fever.   HENT:  Negative for congestion and sore throat.    Eyes:  Negative for photophobia and visual disturbance.   Respiratory:  Negative for cough and shortness of breath.    Gastrointestinal:  Negative for nausea and vomiting.   Genitourinary:  Positive for vaginal bleeding. Negative for difficulty urinating and dysuria.   Musculoskeletal:  Negative for arthralgias and back pain.   Neurological:  Negative for dizziness and headaches.   Psychiatric/Behavioral:  Negative for sleep disturbance. The patient is not nervous/anxious.      Objective:     Vital Signs (Most Recent):  Temp: 98.2 °F (36.8 °C) (08/13/23 1109)  Pulse: 72 (08/13/23 1109)  Resp: 20 (08/13/23 1109)  BP: 129/74 (08/13/23 1109)  SpO2: 100 % (08/13/23 1109) Vital Signs (24h Range):  Temp:  [97.6 °F (36.4 °C)-98.6 °F (37 °C)] 98.2 °F (36.8 °C)  Pulse:  [] 72  Resp:  [10-23] 20  SpO2:  [100 %] 100 %  BP: (111-137)/(58-85) 129/74     Weight: 103 kg (227 lb 1.2 oz)  Body mass index is 40.22 kg/m².     Physical Exam  Vitals reviewed.   HENT:      Head: Normocephalic and atraumatic.      Mouth/Throat:      Mouth: Mucous membranes are moist.      Pharynx: Oropharynx is clear.   Eyes:      Extraocular Movements: Extraocular movements intact.      Conjunctiva/sclera: Conjunctivae normal.   Cardiovascular:      Rate and Rhythm: Normal rate and regular rhythm.      Pulses:  Normal pulses.      Comments: Nail beds pink  Pulmonary:      Effort: Pulmonary effort is normal.      Breath sounds: No wheezing or rhonchi.   Abdominal:      General: Bowel sounds are normal.      Palpations: Abdomen is soft.   Musculoskeletal:         General: No deformity.      Cervical back: Normal range of motion and neck supple.      Right lower leg: No edema.      Left lower leg: No edema.   Skin:     General: Skin is warm and dry.   Neurological:      General: No focal deficit present.      Mental Status: She is alert and oriented to person, place, and time.   Psychiatric:         Mood and Affect: Mood normal.         Thought Content: Thought content normal.          Significant Labs: All pertinent labs within the past 24 hours have been reviewed.    Significant Imaging: I have reviewed all pertinent imaging results/findings within the past 24 hours.

## 2023-08-13 NOTE — ASSESSMENT & PLAN NOTE
08/13/2023  Reviewed sono with pt--10 wk size uterus  Submucous fibrod--4 cm  Discussed with patient amenable to myosure or myomectomy

## 2023-08-13 NOTE — ASSESSMENT & PLAN NOTE
Labs consistent with severe SHANI   Initiated on IV iron 200 mg daily x 5 days   Folate   [place] Profile and hemoglobin electrophoresis pending  Heme following

## 2023-08-13 NOTE — H&P
[facility]  Critical Care Medicine  History & Physical    Patient Name: Crista Momin  MRN: [id number]  Admission Date: 8/12/2023  Hospital Length of Stay: 0 days  Code Status: Full Code  Attending Physician: April Betancourt MD   Primary Care Provider: No primary care provider on file.   Principal Problem: Symptomatic anemia    Subjective:     HPI:  Information obtained from chart review and discussion with patient.     33-year-old female with no known medical history as she does not seek routine medical care who presented to ED 8/12/2023 for evaluation of dizziness. ED evaluation revealing H/H 1.7/6.4. Hemodynamics stable. She was typed and screen and 3 units PRBC were ordered. Patient noted heavy, extended menses this month. GYN and Hematology consulted. Critical care consulted for close hemodynamic monitoring during transfusion with critically low H/H.    Upon exam, Ms. Momin looks surprisingly non-toxic and comfortable. She has no known medical history as she has not seen any type of provider since her early 20s. She does not take any medications on a regular basis. She denies vaping, tobacco use, illicit drug use, and ETOH use. She has regular menstrual cycles that typically last 7 days with the first 3-4 days being heavier with clots. This month, her period lasted 12 days and was fairly heavy for about 7 of those days. She has been experiencing dizziness and lightheadedness, mostly when changing positions, for the past 10 days. She has been having intermittent SOB (after walking about 50 ft.) since May 2023. She used to eat baking flour but stopped about a year ago. In the past few months she has been eating a lot of ice. She denies syncope.       Hospital/ICU Course:  No notes on file     History reviewed. No pertinent past medical history.    No past surgical history on file.    Review of patient's allergies indicates:  No Known Allergies    Family History    None       Tobacco  Use    Smoking status: Not on file    Smokeless tobacco: Not on file   Substance and Sexual Activity    Alcohol use: Not on file    Drug use: Not on file    Sexual activity: Not on file         Review of Systems   Constitutional:  Positive for fatigue. Negative for chills and fever.        PICA- baking flour, ice    HENT:  Negative for congestion and sore throat.    Eyes:  Negative for photophobia and visual disturbance.   Respiratory:  Positive for shortness of breath. Negative for cough and wheezing.    Cardiovascular:  Negative for chest pain and leg swelling.   Gastrointestinal:  Negative for nausea and vomiting.   Endocrine: Negative for polydipsia, polyphagia and polyuria.   Genitourinary:  Negative for difficulty urinating, dysuria, menstrual problem, pelvic pain and vaginal pain.   Musculoskeletal:  Negative for arthralgias and back pain.   Neurological:  Positive for dizziness and light-headedness. Negative for syncope.   Psychiatric/Behavioral:  Negative for sleep disturbance. The patient is not nervous/anxious.      Objective:     Vital Signs (Most Recent):  Temp: 98.2 °F (36.8 °C) (08/12/23 1845)  Pulse: 73 (08/12/23 1930)  Resp: 16 (08/12/23 1930)  BP: 120/64 (08/12/23 1930)  SpO2: 100 % (08/12/23 1930) Vital Signs (24h Range):  Temp:  [97.6 °F (36.4 °C)-98.8 °F (37.1 °C)] 98.2 °F (36.8 °C)  Pulse:  [] 73  Resp:  [9-20] 16  SpO2:  [98 %-100 %] 100 %  BP: (111-133)/(56-64) 120/64     Weight: 100 kg (220 lb 9.1 oz)  There is no height or weight on file to calculate BMI.    No intake or output data in the 24 hours ending 08/12/23 1952     Physical Exam  Vitals reviewed.   Constitutional:       General: She is not in acute distress.     Appearance: She is overweight. She is not toxic-appearing.   HENT:      Head: Normocephalic and atraumatic.      Mouth/Throat:      Mouth: Mucous membranes are moist.      Pharynx: Oropharynx is clear.   Eyes:      Extraocular Movements: Extraocular movements  intact.      Conjunctiva/sclera: Conjunctivae normal.   Cardiovascular:      Rate and Rhythm: Normal rate and regular rhythm.      Pulses: Normal pulses.      Heart sounds: No murmur heard.     Comments: Nail beds are pale.   Pulmonary:      Effort: Pulmonary effort is normal.      Breath sounds: No wheezing or rhonchi.   Abdominal:      General: Bowel sounds are normal.      Palpations: Abdomen is soft.   Musculoskeletal:         General: No deformity.      Cervical back: Normal range of motion and neck supple.      Right lower leg: No edema.      Left lower leg: No edema.   Skin:     General: Skin is warm and dry.   Neurological:      General: No focal deficit present.      Mental Status: She is alert and oriented to person, place, and time.   Psychiatric:         Mood and Affect: Mood normal.         Behavior: Behavior is cooperative.         Thought Content: Thought content normal.         Lines/Drains/Airways       Peripheral Intravenous Line  Duration                  Peripheral IV - Single Lumen 08/12/23 1223 20 G Right Antecubital <1 day         Peripheral IV - Single Lumen 08/12/23 1230 20 G Right Antecubital <1 day         Peripheral IV - Single Lumen 08/12/23 1318 18 G Anterior;Left Forearm <1 day                    Significant Labs:    CBC/Anemia Profile:  Recent Labs   Lab 08/12/23  1224 08/12/23  1307   WBC 7.34  --    HGB 1.6* 1.7*   HCT 6.4*  --    *  --    MCV 64*  --    RDW 23.2*  --    FERRITIN  --  <1*   RETIC  --  1.9        Chemistries:  Recent Labs   Lab 08/12/23  1224      K 3.5      CO2 21*   BUN 4*   CREATININE 0.8   CALCIUM 8.4*   ALBUMIN 3.5   PROT 6.1   BILITOT 0.2   ALKPHOS 35*   ALT <5*   AST 7*       All pertinent labs within the past 24 hours have been reviewed.    Significant Imaging:   I have reviewed all pertinent imaging results/findings within the past 24 hours.    Assessment/Plan:     Renal/  Menorrhagia with regular cycle  Reports regular menses with heavy  blood flow 3-4 out of 7 days  This month period was 12 days with 5-7 heavy flow days   GYN consulted   Pelvic US pending   Will need outpatient follow up    Oncology  * Symptomatic anemia  H/H 1.7/6.4; has been associated with dizziness, lightheadedness, and SOB + PICA  Anemia work up in progress   Hematology and GYN consulted   Typed and screened for 3 units PRBC   Hemodynamics are stable   Follow up post-transfusion CBC       Anticipate short ICU stay pending patient remains stable with blood transfusion. Will watch closely during transfusion and follow up CBC.      Iva Crook NP  Critical Care Medicine  [facility] - Emergency Dept.

## 2023-08-13 NOTE — PLAN OF CARE
AAOx4.  NSR on monitor.  Not requiring oxygen at this time.  Still on menstrual cycle.  Downgraded to medsurg today. Awaiting transfer.   POC reviewed with pt.

## 2023-08-14 ENCOUNTER — TELEPHONE (OUTPATIENT)
Dept: OBSTETRICS AND GYNECOLOGY | Facility: CLINIC | Age: 33
End: 2023-08-14
Payer: COMMERCIAL

## 2023-08-14 VITALS
DIASTOLIC BLOOD PRESSURE: 71 MMHG | HEART RATE: 61 BPM | TEMPERATURE: 98 F | WEIGHT: 227.06 LBS | BODY MASS INDEX: 40.23 KG/M2 | OXYGEN SATURATION: 100 % | HEIGHT: 63 IN | SYSTOLIC BLOOD PRESSURE: 127 MMHG | RESPIRATION RATE: 18 BRPM

## 2023-08-14 PROCEDURE — 63600175 PHARM REV CODE 636 W HCPCS: Performed by: NURSE PRACTITIONER

## 2023-08-14 PROCEDURE — 99232 PR SUBSEQUENT HOSPITAL CARE,LEVL II: ICD-10-PCS | Mod: ,,, | Performed by: OBSTETRICS & GYNECOLOGY

## 2023-08-14 PROCEDURE — 25000003 PHARM REV CODE 250: Performed by: OBSTETRICS & GYNECOLOGY

## 2023-08-14 PROCEDURE — 96376 TX/PRO/DX INJ SAME DRUG ADON: CPT

## 2023-08-14 PROCEDURE — G0378 HOSPITAL OBSERVATION PER HR: HCPCS

## 2023-08-14 PROCEDURE — 25000003 PHARM REV CODE 250: Performed by: NURSE PRACTITIONER

## 2023-08-14 PROCEDURE — 99232 SBSQ HOSP IP/OBS MODERATE 35: CPT | Mod: ,,, | Performed by: OBSTETRICS & GYNECOLOGY

## 2023-08-14 RX ORDER — MEDROXYPROGESTERONE ACETATE 10 MG/1
10 TABLET ORAL 2 TIMES DAILY
Qty: 10 TABLET | Refills: 1 | Status: SHIPPED | OUTPATIENT
Start: 2023-08-14 | End: 2023-08-17 | Stop reason: SDUPTHER

## 2023-08-14 RX ORDER — FOLIC ACID 1 MG/1
1 TABLET ORAL DAILY
Qty: 30 TABLET | Refills: 0 | Status: SHIPPED | OUTPATIENT
Start: 2023-08-15 | End: 2024-01-30

## 2023-08-14 RX ADMIN — MEDROXYPROGESTERONE ACETATE 10 MG: 5 TABLET ORAL at 09:08

## 2023-08-14 RX ADMIN — FOLIC ACID 1 MG: 1 TABLET ORAL at 09:08

## 2023-08-14 RX ADMIN — IRON SUCROSE 200 MG: 20 INJECTION, SOLUTION INTRAVENOUS at 09:08

## 2023-08-14 RX ADMIN — MUPIROCIN: 20 OINTMENT TOPICAL at 09:08

## 2023-08-14 NOTE — SUBJECTIVE & OBJECTIVE
Interval History: Patient reports she is doing much better. She is ambulating well without dizziness. Voiding well. No nausea or vomiting. She is eating and tolerating diet. No fever overnight. Vaginal bleeding has stopped.      Scheduled Meds:   folic acid  1 mg Oral Daily    IRON SUCROSE IV ORDERABLE  200 mg Intravenous Daily    medroxyPROGESTERone  10 mg Oral BID    mupirocin   Nasal BID     Continuous Infusions:  PRN Meds:0.9%  NaCl infusion (for blood administration), 0.9%  NaCl infusion (for blood administration), acetaminophen, sodium chloride 0.9%    Review of patient's allergies indicates:  No Known Allergies    Objective:     Vital Signs (Most Recent):  Temp: 98.3 °F (36.8 °C) (08/14/23 0719)  Pulse: 61 (08/14/23 0900)  Resp: 18 (08/14/23 0719)  BP: 127/71 (08/14/23 0719)  SpO2: 100 % (08/14/23 0719) Vital Signs (24h Range):  Temp:  [97.8 °F (36.6 °C)-99.3 °F (37.4 °C)] 98.3 °F (36.8 °C)  Pulse:  [54-93] 61  Resp:  [17-20] 18  SpO2:  [100 %] 100 %  BP: (103-137)/(56-77) 127/71     Weight: 103 kg (227 lb 1.2 oz)  Body mass index is 40.22 kg/m².  Patient's last menstrual period was 07/30/2023 (exact date).    I&O (Last 24H):  No intake or output data in the 24 hours ending 08/14/23 1034      Laboratory:  CBC:   Recent Labs   Lab 08/13/23  0640   WBC 7.35   RBC 2.59*   HGB 6.4*   HCT 20.4*   *   MCV 79*   MCH 24.7*   MCHC 31.4*       Diagnostic Results:  Labs: Reviewed     Physical Exam:   Constitutional: She is oriented to person, place, and time. She appears well-developed and well-nourished. No distress.             Abdominal: Soft. She exhibits no distension. There is no abdominal tenderness.             Musculoskeletal: No edema.       Neurological: She is alert and oriented to person, place, and time.    Skin: Skin is warm and dry. She is not diaphoretic. No erythema.

## 2023-08-14 NOTE — PROGRESS NOTES
[facility] Surg  Obstetrics & Gynecology  Progress Note    Patient Name: Crista Momin  MRN: [id number]  Admission Date: 8/12/2023  Primary Care Provider: Unable, To Obtain  Principal Problem: Symptomatic anemia    Subjective:     HPI:  Asked to see patient for history of heavy cycles  32 y/o g0 reports heavy menses all her life.  Reports never seen by gyn and does not have a pcp.  In ER today for dizziness x 10d .  Found to have a h/h of 1.6/6    Patient reports menses started age 10; describes cycles as heavy for her entire life.  Menses usually monthly, flow 7 days, heavy for 3 days; using overnight pads, change q 3-4 hr;   Does not need to double up; dysmenorrhea controlled with aleve.  Most recent menses has lasted 13 d and only having occasional spotting.  Reports this menses was  Heavy for 5 days.    +sexually active, male partners, last intercourse 3.5 yrs ago, +condoms      Interval History: Patient reports she is doing much better. She is ambulating well without dizziness. Voiding well. No nausea or vomiting. She is eating and tolerating diet. No fever overnight. Vaginal bleeding has stopped.      Scheduled Meds:   folic acid  1 mg Oral Daily    IRON SUCROSE IV ORDERABLE  200 mg Intravenous Daily    medroxyPROGESTERone  10 mg Oral BID    mupirocin   Nasal BID     Continuous Infusions:  PRN Meds:0.9%  NaCl infusion (for blood administration), 0.9%  NaCl infusion (for blood administration), acetaminophen, sodium chloride 0.9%    Review of patient's allergies indicates:  No Known Allergies    Objective:     Vital Signs (Most Recent):  Temp: 98.3 °F (36.8 °C) (08/14/23 0719)  Pulse: 61 (08/14/23 0900)  Resp: 18 (08/14/23 0719)  BP: 127/71 (08/14/23 0719)  SpO2: 100 % (08/14/23 0719) Vital Signs (24h Range):  Temp:  [97.8 °F (36.6 °C)-99.3 °F (37.4 °C)] 98.3 °F (36.8 °C)  Pulse:  [54-93] 61  Resp:  [17-20] 18  SpO2:  [100 %] 100 %  BP: (103-137)/(56-77) 127/71     Weight: 103 kg (227 lb 1.2  oz)  Body mass index is 40.22 kg/m².  Patient's last menstrual period was 07/30/2023 (exact date).    I&O (Last 24H):  No intake or output data in the 24 hours ending 08/14/23 1034      Laboratory:  CBC:   Recent Labs   Lab 08/13/23  0640   WBC 7.35   RBC 2.59*   HGB 6.4*   HCT 20.4*   *   MCV 79*   MCH 24.7*   MCHC 31.4*       Diagnostic Results:  Labs: Reviewed     Physical Exam:   Constitutional: She is oriented to person, place, and time. She appears well-developed and well-nourished. No distress.             Abdominal: Soft. She exhibits no distension. There is no abdominal tenderness.             Musculoskeletal: No edema.       Neurological: She is alert and oriented to person, place, and time.    Skin: Skin is warm and dry. She is not diaphoretic. No erythema.               Assessment/Plan:     Fibroids, submucosal  08/13/2023  Reviewed sono with pt--10 wk size uterus  Submucous fibrod--4 cm  Discussed with patient amenable to myosure or myomectomy    Iron deficiency anemia due to chronic blood loss  08/13/2023  S/p 5 units prbc; hgb 6.4      Menorrhagia with regular cycle  08/12/2023   Currently no longer bleeding  Agree with blood transfusion  Pelvic sono results pending  Pt agrees to follow up with me in gyn clinic  08/13/2023  Patient reports bleeding resumed last night  Provera added 10mg bid  Continue oral iron  hgb now 6.1  If, bleeding does not respond to provera, could consider Uterine artery embolization or surgery--myosure or myomectomy  8/14-Bleeding resolved. Plan to finish course of Provera and okay to discharge from GYN standpoint. Will arrange close outpatient GYN follow up.        Aziza Calzada PA-C  Obstetrics & Gynecology  [facility] - Med Surg

## 2023-08-14 NOTE — TELEPHONE ENCOUNTER
----- Message from Alba Tobin MD sent at 8/12/2023  3:52 PM CDT -----  Needs hosp f/u--gyn exam  (appt this wk or next)  Menorrhagia to anemia

## 2023-08-14 NOTE — ASSESSMENT & PLAN NOTE
08/12/2023   Currently no longer bleeding  Agree with blood transfusion  Pelvic sono results pending  Pt agrees to follow up with me in gyn clinic  08/13/2023  Patient reports bleeding resumed last night  Provera added 10mg bid  Continue oral iron  hgb now 6.1  If, bleeding does not respond to provera, could consider Uterine artery embolization or surgery--myosure or myomectomy  8/14-Bleeding resolved. Plan to finish course of Provera and okay to discharge from GYN standpoint. Will arrange close outpatient GYN follow up.

## 2023-08-14 NOTE — PLAN OF CARE
[facility] - Med Surg  Discharge Final Note    Primary Care Provider: Unable, To Obtain    Expected Discharge Date: 8/14/2023    Final Discharge Note (most recent)       Final Note - 08/14/23 1015          Final Note    Assessment Type Final Discharge Note     Anticipated Discharge Disposition Home or Self Care     Hospital Resources/Appts/Education Provided Appointments scheduled and added to AVS                                Contact Info       Alba Tobin MD   Specialty: Obstetrics and Gynecology    [address] [place] [id number]   Phone: [phone]       Next Steps: Go in 1 week(s)    Instructions: pratt office (hosp f/u)

## 2023-08-14 NOTE — PLAN OF CARE
Problem: Adult Inpatient Plan of Care  Goal: Plan of Care Review  Outcome: Ongoing, Progressing  Flowsheets (Taken 8/14/2023 [id number])  Plan of Care Reviewed With: patient     Problem: Fall Injury Risk  Goal: Absence of Fall and Fall-Related Injury  Outcome: Ongoing, Progressing

## 2023-08-15 ENCOUNTER — PATIENT OUTREACH (OUTPATIENT)
Dept: ADMINISTRATIVE | Facility: HOSPITAL | Age: 33
End: 2023-08-15
Payer: COMMERCIAL

## 2023-08-15 LAB
FACT VIII ACT/NOR PPP: 270 % (ref 55–200)
VON WILLEBRAND EVAL PPP-IMP: ABNORMAL
VWF AG ACT/NOR PPP IA: 187 % (ref 55–200)
VWF:AC ACT/NOR PPP IA: 171 % (ref 55–200)

## 2023-08-15 NOTE — PROGRESS NOTES
HOSPITAL FOLLOW UP: attempted to contact pt to confirm HFU 08/16/23 with Dr Thompson, no ans, lvm.

## 2023-08-16 ENCOUNTER — PATIENT OUTREACH (OUTPATIENT)
Dept: ADMINISTRATIVE | Facility: CLINIC | Age: 33
End: 2023-08-16
Payer: COMMERCIAL

## 2023-08-16 ENCOUNTER — OFFICE VISIT (OUTPATIENT)
Dept: INTERNAL MEDICINE | Facility: CLINIC | Age: 33
End: 2023-08-16
Payer: COMMERCIAL

## 2023-08-16 VITALS
HEIGHT: 63 IN | TEMPERATURE: 97 F | SYSTOLIC BLOOD PRESSURE: 118 MMHG | OXYGEN SATURATION: 100 % | DIASTOLIC BLOOD PRESSURE: 74 MMHG | HEART RATE: 98 BPM | WEIGHT: 223.75 LBS | BODY MASS INDEX: 39.64 KG/M2

## 2023-08-16 DIAGNOSIS — N92.0 MENORRHAGIA WITH REGULAR CYCLE: Chronic | ICD-10-CM

## 2023-08-16 DIAGNOSIS — E66.01 CLASS 2 SEVERE OBESITY DUE TO EXCESS CALORIES WITH SERIOUS COMORBIDITY AND BODY MASS INDEX (BMI) OF 39.0 TO 39.9 IN ADULT: Chronic | ICD-10-CM

## 2023-08-16 DIAGNOSIS — D50.0 IRON DEFICIENCY ANEMIA DUE TO CHRONIC BLOOD LOSS: Chronic | ICD-10-CM

## 2023-08-16 DIAGNOSIS — H81.10 BENIGN PAROXYSMAL POSITIONAL VERTIGO, UNSPECIFIED LATERALITY: ICD-10-CM

## 2023-08-16 DIAGNOSIS — D52.0 DIETARY FOLATE DEFICIENCY ANEMIA: ICD-10-CM

## 2023-08-16 DIAGNOSIS — Z13.1 SCREENING FOR DIABETES MELLITUS: ICD-10-CM

## 2023-08-16 DIAGNOSIS — Z13.6 ENCOUNTER FOR LIPID SCREENING FOR CARDIOVASCULAR DISEASE: ICD-10-CM

## 2023-08-16 DIAGNOSIS — Z00.00 PREVENTATIVE HEALTH CARE: Primary | ICD-10-CM

## 2023-08-16 DIAGNOSIS — D25.0 FIBROIDS, SUBMUCOSAL: Chronic | ICD-10-CM

## 2023-08-16 DIAGNOSIS — Z13.220 ENCOUNTER FOR LIPID SCREENING FOR CARDIOVASCULAR DISEASE: ICD-10-CM

## 2023-08-16 DIAGNOSIS — Z11.3 ROUTINE SCREENING FOR STI (SEXUALLY TRANSMITTED INFECTION): ICD-10-CM

## 2023-08-16 PROBLEM — D64.9 SYMPTOMATIC ANEMIA: Status: RESOLVED | Noted: 2023-08-12 | Resolved: 2023-08-16

## 2023-08-16 LAB
HGB A2 MFR BLD HPLC: 1.3 % (ref 2.2–3.2)
HGB FRACT BLD ELPH-IMP: ABNORMAL
HGB FRACT BLD ELPH-IMP: ABNORMAL

## 2023-08-16 PROCEDURE — 3074F PR MOST RECENT SYSTOLIC BLOOD PRESSURE < 130 MM HG: ICD-10-PCS | Mod: CPTII,S$GLB,, | Performed by: FAMILY MEDICINE

## 2023-08-16 PROCEDURE — 3044F HG A1C LEVEL LT 7.0%: CPT | Mod: CPTII,S$GLB,, | Performed by: FAMILY MEDICINE

## 2023-08-16 PROCEDURE — 99203 OFFICE O/P NEW LOW 30 MIN: CPT | Mod: 25,S$GLB,, | Performed by: FAMILY MEDICINE

## 2023-08-16 PROCEDURE — 3078F DIAST BP <80 MM HG: CPT | Mod: CPTII,S$GLB,, | Performed by: FAMILY MEDICINE

## 2023-08-16 PROCEDURE — 99203 PR OFFICE/OUTPT VISIT, NEW, LEVL III, 30-44 MIN: ICD-10-PCS | Mod: 25,S$GLB,, | Performed by: FAMILY MEDICINE

## 2023-08-16 PROCEDURE — 99385 PR PREVENTIVE VISIT,NEW,18-39: ICD-10-PCS | Mod: 1E,GY,S$GLB, | Performed by: FAMILY MEDICINE

## 2023-08-16 PROCEDURE — 3008F PR BODY MASS INDEX (BMI) DOCUMENTED: ICD-10-PCS | Mod: CPTII,S$GLB,, | Performed by: FAMILY MEDICINE

## 2023-08-16 PROCEDURE — 3074F SYST BP LT 130 MM HG: CPT | Mod: CPTII,S$GLB,, | Performed by: FAMILY MEDICINE

## 2023-08-16 PROCEDURE — 99999 PR PBB SHADOW E&M-EST. PATIENT-LVL IV: CPT | Mod: PBBFAC,,, | Performed by: FAMILY MEDICINE

## 2023-08-16 PROCEDURE — 3044F PR MOST RECENT HEMOGLOBIN A1C LEVEL <7.0%: ICD-10-PCS | Mod: CPTII,S$GLB,, | Performed by: FAMILY MEDICINE

## 2023-08-16 PROCEDURE — 1111F DSCHRG MED/CURRENT MED MERGE: CPT | Mod: CPTII,S$GLB,, | Performed by: FAMILY MEDICINE

## 2023-08-16 PROCEDURE — 99999 PR PBB SHADOW E&M-EST. PATIENT-LVL IV: ICD-10-PCS | Mod: PBBFAC,,, | Performed by: FAMILY MEDICINE

## 2023-08-16 PROCEDURE — 3078F PR MOST RECENT DIASTOLIC BLOOD PRESSURE < 80 MM HG: ICD-10-PCS | Mod: CPTII,S$GLB,, | Performed by: FAMILY MEDICINE

## 2023-08-16 PROCEDURE — 3008F BODY MASS INDEX DOCD: CPT | Mod: CPTII,S$GLB,, | Performed by: FAMILY MEDICINE

## 2023-08-16 PROCEDURE — 1111F PR DISCHARGE MEDS RECONCILED W/ CURRENT OUTPATIENT MED LIST: ICD-10-PCS | Mod: CPTII,S$GLB,, | Performed by: FAMILY MEDICINE

## 2023-08-16 PROCEDURE — 99385 PREV VISIT NEW AGE 18-39: CPT | Mod: 1E,GY,S$GLB, | Performed by: FAMILY MEDICINE

## 2023-08-16 NOTE — PROGRESS NOTES
"ANNUAL WELLNESS VISIT (PREVENTIVE SERVICES)  8/16/23  9:20 AM CDT    CHIEF COMPLAINT: Establish Care, Annual Exam, and Hospital Follow Up    HEALTH MAINTENANCE INTERVENTIONS - UP TO DATE  Health Maintenance Topics with due status: Not Due       Topic Last Completion Date    Cervical Cancer Screening 08/17/2023    Hemoglobin A1c (Diabetic Prevention Screening) 08/17/2023    Influenza Vaccine Not Due     HEALTH MAINTENANCE INTERVENTIONS - DUE OR DUE SOON  Health Maintenance Due   Topic Date Due    COVID-19 Vaccine (1) Never done    TETANUS VACCINE  Never done      Crista is here for annual wellness and preventive services visit.    Crista is also here for evaluation and management of unrelated problems reported separately.      1. Preventative health care  -     CBC Auto Differential; Future; Expected date: 08/16/2023  -     Ferritin; Future; Expected date: 08/16/2023  -     Iron and TIBC; Future; Expected date: 08/16/2023  -     LIPID PANEL; Future; Expected date: 08/16/2023  -     HEMOGLOBIN A1C; Future; Expected date: 08/16/2023  -     RPR; Future; Expected date: 08/16/2023    2. Routine screening for STI (sexually transmitted infection)  -     RPR; Future; Expected date: 08/16/2023    3. Screening for diabetes mellitus  -     HEMOGLOBIN A1C; Future; Expected date: 08/16/2023    4. Encounter for lipid screening for cardiovascular disease  -     LIPID PANEL; Future; Expected date: 08/16/2023    Review of Systems   Respiratory:  Negative for chest tightness and shortness of breath.    Cardiovascular:  Negative for chest pain.   Endocrine: Negative for polydipsia and polyuria.   Neurological:  Negative for syncope and light-headedness.       Vitals:    08/16/23 0939   BP: 118/74   BP Location: Right arm   Patient Position: Sitting   BP Method: Large (Manual)   Pulse: 98   Temp: 97.3 °F (36.3 °C)   TempSrc: Tympanic   SpO2: 100%   Weight: 101.5 kg (223 lb 12.3 oz)   Height: 5' 3" (1.6 m)   Physical Exam  Vitals " "reviewed.   Constitutional:       General: She is not in acute distress.     Appearance: Normal appearance. She is not ill-appearing, toxic-appearing or diaphoretic.   HENT:      Head: Normocephalic and atraumatic.      Right Ear: Tympanic membrane, ear canal and external ear normal.      Left Ear: Tympanic membrane, ear canal and external ear normal.      Ears:      Comments: Hearing grossly intact.  Eyes:      General: No scleral icterus.     Conjunctiva/sclera: Conjunctivae normal.   Neck:      Vascular: No carotid bruit.   Cardiovascular:      Rate and Rhythm: Normal rate and regular rhythm.      Heart sounds: Normal heart sounds.   Pulmonary:      Effort: Pulmonary effort is normal.      Breath sounds: Normal breath sounds.   Abdominal:      General: Bowel sounds are normal. There is no distension.      Palpations: Abdomen is soft. There is no mass.      Tenderness: There is no abdominal tenderness.   Musculoskeletal:         General: No tenderness.      Cervical back: No muscular tenderness.   Lymphadenopathy:      Cervical: No cervical adenopathy.   Skin:     General: Skin is warm and dry.      Coloration: Skin is not jaundiced.   Neurological:      General: No focal deficit present.      Mental Status: She is alert and oriented to person, place, and time.      Cranial Nerves: No cranial nerve deficit.      Gait: Gait normal.   Psychiatric:         Mood and Affect: Mood normal.         Behavior: Behavior normal.         Judgment: Judgment normal.         Documentation entered by me for this encounter may have been done in part using speech-recognition technology. Although I have made an effort to ensure accuracy, "sound like" errors may exist and should be interpreted in context.    ADDITIONAL E&M SERVICES PROVIDED - UNRELATED TO PREVENTIVE SERVICES  8/16/23  9:20 AM CDT    In addition to and unrelated to the preventive services provided this date, the following condition(s) were also evaluated and " managed.    CHIEF COMPLAINT: Anemia    HPI: The following problems were reported or identified, and each was addressed.    She was recently hospitalized for severe symptomatic anemia related to menorrhagia secondary to uterine fibroids. She has an appointment with gynecology tomorrow for discussion of treatment options. She was also apparently diagnosed with folate deficiency. I am rechecking her anemia labs.    We discussed her obesity, with a body mass index of 39.64 kg/m2. She says she has been losing weight intentionally over the last year through healthy lifestyle choices, improved diet, and increased exercise. She declined further intervention regarding her obesity. I told her to let me know if she changes her mind.    She reports that over the last 12 months, she has had almost six episodes of typical benign positional vertigo described as a sensation of the room spinning when she moved her head, often when getting up in the morning or going to bed at night. She says each episode lasted only a few minutes and then spontaneously resolved. No vision changes. No hearing loss reported. We discussed risks and benefits of treatment options. Additional education provided on the after-visit summary.    1. Iron deficiency anemia due to chronic blood loss  -     CBC Auto Differential; Future; Expected date: 08/16/2023  -     Ferritin; Future; Expected date: 08/16/2023  -     Iron and TIBC; Future; Expected date: 08/16/2023    2. Menorrhagia with regular cycle  -     GYN appointment Thursday, August 17, 2023 at 11:15 AM with Alba Tobin MD    3. Fibroids, submucosal  -     GYN appointment Thursday, August 17, 2023 at 11:15 AM with Alba Tobin MD    4. Class 2 severe obesity due to excess calories with serious comorbidity and body mass index (BMI) of 39.0 to 39.9 in adult  Assessment & Plan:  She reports she is losing weight (three dress sizes) through therapeutic lifestyle changes, including diet and exercise.  "I offered referral to [facility], but Crista declined.   Wt Readings from Last 6 Encounters:   08/16/23 101.5 kg (223 lb 12.3 oz)   08/12/23 103 kg (227 lb 1.2 oz)     Estimated body mass index is 39.64 kg/m² as calculated from the following:    Height as of this encounter: 5' 3" (1.6 m).    Weight as of this encounter: 101.5 kg (223 lb 12.3 oz).      5. Dietary folate deficiency anemia  -     FOLATE; Future; Expected date: 08/16/2023    6. Benign paroxysmal positional vertigo, unspecified laterality    PHYSICAL EXAM  Vitals:    08/16/23 0939   BP: 118/74   BP Location: Right arm   Patient Position: Sitting   BP Method: Large (Manual)   Pulse: 98   Temp: 97.3 °F (36.3 °C)   TempSrc: Tympanic   SpO2: 100%   Weight: 101.5 kg (223 lb 12.3 oz)   Height: 5' 3" (1.6 m)   Physical Exam  Vitals reviewed.   Constitutional:       General: She is not in acute distress.     Appearance: Normal appearance. She is not ill-appearing, toxic-appearing or diaphoretic.   HENT:      Head: Normocephalic and atraumatic.      Right Ear: Tympanic membrane, ear canal and external ear normal.      Left Ear: Tympanic membrane, ear canal and external ear normal.      Ears:      Comments: Hearing grossly intact.  Cardiovascular:      Rate and Rhythm: Normal rate and regular rhythm.      Heart sounds: Normal heart sounds.   Pulmonary:      Effort: Pulmonary effort is normal.      Breath sounds: Normal breath sounds.   Abdominal:      Tenderness: There is no abdominal tenderness.   Skin:     General: Skin is warm and dry.  Neurological:      General: No focal deficit present.      Mental Status: She is alert and oriented to person, place, and time.     Follow up in about 1 year (around 8/16/2024) for annual exam.  Documentation entered by me for this encounter may have been done in part using speech-recognition technology. Although I have made an effort to ensure accuracy, "sound like" errors may exist and should be interpreted " in context.    Future Appointments   Date Time Provider Department Center   8/17/2023 11:15 AM Alba Tobin MD [facility] JAMMIE Feliciano

## 2023-08-16 NOTE — ASSESSMENT & PLAN NOTE
"She reports she is losing weight (three dress sizes) through therapeutic lifestyle changes, including diet and exercise. I offered referral to [facility] & Wellness clinic, but Crista declined.   Wt Readings from Last 6 Encounters:   08/16/23 101.5 kg (223 lb 12.3 oz)   08/12/23 103 kg (227 lb 1.2 oz)     Estimated body mass index is 39.64 kg/m² as calculated from the following:    Height as of this encounter: 5' 3" (1.6 m).    Weight as of this encounter: 101.5 kg (223 lb 12.3 oz).    "

## 2023-08-16 NOTE — PROGRESS NOTES
C3 nurse spoke with Crista Momin  for a TCC post hospital discharge follow up call. The patient had a scheduled HOSFU appointment with HENRY Thompson MD  on 8/16/2023 @ [id number].

## 2023-08-17 ENCOUNTER — LAB VISIT (OUTPATIENT)
Dept: LAB | Facility: HOSPITAL | Age: 33
End: 2023-08-17
Attending: FAMILY MEDICINE
Payer: COMMERCIAL

## 2023-08-17 ENCOUNTER — OFFICE VISIT (OUTPATIENT)
Dept: OBSTETRICS AND GYNECOLOGY | Facility: CLINIC | Age: 33
End: 2023-08-17
Payer: COMMERCIAL

## 2023-08-17 VITALS
DIASTOLIC BLOOD PRESSURE: 70 MMHG | BODY MASS INDEX: 39.88 KG/M2 | SYSTOLIC BLOOD PRESSURE: 124 MMHG | HEIGHT: 63 IN | WEIGHT: 225.06 LBS

## 2023-08-17 DIAGNOSIS — Z13.6 ENCOUNTER FOR LIPID SCREENING FOR CARDIOVASCULAR DISEASE: ICD-10-CM

## 2023-08-17 DIAGNOSIS — D25.0 FIBROIDS, SUBMUCOSAL: Chronic | ICD-10-CM

## 2023-08-17 DIAGNOSIS — Z12.4 SCREENING FOR CERVICAL CANCER: ICD-10-CM

## 2023-08-17 DIAGNOSIS — Z01.419 ENCOUNTER FOR GYNECOLOGICAL EXAMINATION (GENERAL) (ROUTINE) WITHOUT ABNORMAL FINDINGS: Primary | ICD-10-CM

## 2023-08-17 DIAGNOSIS — Z11.3 ROUTINE SCREENING FOR STI (SEXUALLY TRANSMITTED INFECTION): ICD-10-CM

## 2023-08-17 DIAGNOSIS — N92.0 MENORRHAGIA WITH REGULAR CYCLE: Chronic | ICD-10-CM

## 2023-08-17 DIAGNOSIS — Z00.00 PREVENTATIVE HEALTH CARE: ICD-10-CM

## 2023-08-17 DIAGNOSIS — D50.0 IRON DEFICIENCY ANEMIA DUE TO CHRONIC BLOOD LOSS: Chronic | ICD-10-CM

## 2023-08-17 DIAGNOSIS — Z13.1 SCREENING FOR DIABETES MELLITUS: ICD-10-CM

## 2023-08-17 DIAGNOSIS — Z13.220 ENCOUNTER FOR LIPID SCREENING FOR CARDIOVASCULAR DISEASE: ICD-10-CM

## 2023-08-17 LAB
ANISOCYTOSIS BLD QL SMEAR: ABNORMAL
BASO STIPL BLD QL SMEAR: ABNORMAL
BASOPHILS NFR BLD: 2 % (ref 0–1.9)
CHOLEST SERPL-MCNC: 116 MG/DL (ref 120–199)
CHOLEST/HDLC SERPL: 2.8 {RATIO} (ref 2–5)
DIFFERENTIAL METHOD: ABNORMAL
EOSINOPHIL NFR BLD: 3 % (ref 0–8)
ERYTHROCYTE [DISTWIDTH] IN BLOOD BY AUTOMATED COUNT: 23.1 % (ref 11.5–14.5)
GIANT PLATELETS BLD QL SMEAR: PRESENT
HCT VFR BLD AUTO: 26.3 % (ref 37–48.5)
HDLC SERPL-MCNC: 42 MG/DL (ref 40–75)
HDLC SERPL: 36.2 % (ref 20–50)
HGB BLD-MCNC: 7.6 G/DL (ref 12–16)
HYPOCHROMIA BLD QL SMEAR: ABNORMAL
IMM GRANULOCYTES # BLD AUTO: ABNORMAL K/UL (ref 0–0.04)
IMM GRANULOCYTES NFR BLD AUTO: ABNORMAL % (ref 0–0.5)
LDLC SERPL CALC-MCNC: 58.8 MG/DL (ref 63–159)
LYMPHOCYTES NFR BLD: 19 % (ref 18–48)
MCH RBC QN AUTO: 25.5 PG (ref 27–31)
MCHC RBC AUTO-ENTMCNC: 28.9 G/DL (ref 32–36)
MCV RBC AUTO: 88 FL (ref 82–98)
METAMYELOCYTES NFR BLD MANUAL: 1 %
MONOCYTES NFR BLD: 10 % (ref 4–15)
MYELOCYTES NFR BLD MANUAL: 1 %
NEUTROPHILS NFR BLD: 62 % (ref 38–73)
NEUTS BAND NFR BLD MANUAL: 2 %
NONHDLC SERPL-MCNC: 74 MG/DL
NRBC BLD-RTO: 2 /100 WBC
OVALOCYTES BLD QL SMEAR: ABNORMAL
PLATELET # BLD AUTO: 426 K/UL (ref 150–450)
PLATELET BLD QL SMEAR: ABNORMAL
PMV BLD AUTO: 11 FL (ref 9.2–12.9)
POIKILOCYTOSIS BLD QL SMEAR: SLIGHT
POLYCHROMASIA BLD QL SMEAR: ABNORMAL
RBC # BLD AUTO: 2.98 M/UL (ref 4–5.4)
SCHISTOCYTES BLD QL SMEAR: ABNORMAL
TRIGL SERPL-MCNC: 76 MG/DL (ref 30–150)
WBC # BLD AUTO: 14.79 K/UL (ref 3.9–12.7)

## 2023-08-17 PROCEDURE — 3078F PR MOST RECENT DIASTOLIC BLOOD PRESSURE < 80 MM HG: ICD-10-PCS | Mod: CPTII,S$GLB,, | Performed by: OBSTETRICS & GYNECOLOGY

## 2023-08-17 PROCEDURE — 1159F PR MEDICATION LIST DOCUMENTED IN MEDICAL RECORD: ICD-10-PCS | Mod: CPTII,S$GLB,, | Performed by: OBSTETRICS & GYNECOLOGY

## 2023-08-17 PROCEDURE — 85027 COMPLETE CBC AUTOMATED: CPT | Performed by: FAMILY MEDICINE

## 2023-08-17 PROCEDURE — 82728 ASSAY OF FERRITIN: CPT | Performed by: FAMILY MEDICINE

## 2023-08-17 PROCEDURE — 84466 ASSAY OF TRANSFERRIN: CPT | Performed by: FAMILY MEDICINE

## 2023-08-17 PROCEDURE — 3078F DIAST BP <80 MM HG: CPT | Mod: CPTII,S$GLB,, | Performed by: OBSTETRICS & GYNECOLOGY

## 2023-08-17 PROCEDURE — 3074F SYST BP LT 130 MM HG: CPT | Mod: CPTII,S$GLB,, | Performed by: OBSTETRICS & GYNECOLOGY

## 2023-08-17 PROCEDURE — 87624 HPV HI-RISK TYP POOLED RSLT: CPT | Performed by: OBSTETRICS & GYNECOLOGY

## 2023-08-17 PROCEDURE — 99395 PR PREVENTIVE VISIT,EST,18-39: ICD-10-PCS | Mod: S$GLB,,, | Performed by: OBSTETRICS & GYNECOLOGY

## 2023-08-17 PROCEDURE — 3008F BODY MASS INDEX DOCD: CPT | Mod: CPTII,S$GLB,, | Performed by: OBSTETRICS & GYNECOLOGY

## 2023-08-17 PROCEDURE — 88175 CYTOPATH C/V AUTO FLUID REDO: CPT | Performed by: OBSTETRICS & GYNECOLOGY

## 2023-08-17 PROCEDURE — 83036 HEMOGLOBIN GLYCOSYLATED A1C: CPT | Performed by: FAMILY MEDICINE

## 2023-08-17 PROCEDURE — 85007 BL SMEAR W/DIFF WBC COUNT: CPT | Performed by: FAMILY MEDICINE

## 2023-08-17 PROCEDURE — 99999 PR PBB SHADOW E&M-EST. PATIENT-LVL III: ICD-10-PCS | Mod: PBBFAC,,, | Performed by: OBSTETRICS & GYNECOLOGY

## 2023-08-17 PROCEDURE — 83540 ASSAY OF IRON: CPT | Performed by: FAMILY MEDICINE

## 2023-08-17 PROCEDURE — 86592 SYPHILIS TEST NON-TREP QUAL: CPT | Performed by: FAMILY MEDICINE

## 2023-08-17 PROCEDURE — 3008F PR BODY MASS INDEX (BMI) DOCUMENTED: ICD-10-PCS | Mod: CPTII,S$GLB,, | Performed by: OBSTETRICS & GYNECOLOGY

## 2023-08-17 PROCEDURE — 99395 PREV VISIT EST AGE 18-39: CPT | Mod: S$GLB,,, | Performed by: OBSTETRICS & GYNECOLOGY

## 2023-08-17 PROCEDURE — 1159F MED LIST DOCD IN RCRD: CPT | Mod: CPTII,S$GLB,, | Performed by: OBSTETRICS & GYNECOLOGY

## 2023-08-17 PROCEDURE — 3074F PR MOST RECENT SYSTOLIC BLOOD PRESSURE < 130 MM HG: ICD-10-PCS | Mod: CPTII,S$GLB,, | Performed by: OBSTETRICS & GYNECOLOGY

## 2023-08-17 PROCEDURE — 36415 COLL VENOUS BLD VENIPUNCTURE: CPT | Mod: PO | Performed by: FAMILY MEDICINE

## 2023-08-17 PROCEDURE — 99999 PR PBB SHADOW E&M-EST. PATIENT-LVL III: CPT | Mod: PBBFAC,,, | Performed by: OBSTETRICS & GYNECOLOGY

## 2023-08-17 PROCEDURE — 80061 LIPID PANEL: CPT | Performed by: FAMILY MEDICINE

## 2023-08-17 RX ORDER — MEDROXYPROGESTERONE ACETATE 10 MG/1
10 TABLET ORAL 2 TIMES DAILY
Qty: 60 TABLET | Refills: 1 | Status: SHIPPED | OUTPATIENT
Start: 2023-08-17 | End: 2023-10-23

## 2023-08-17 NOTE — PROGRESS NOTES
Subjective:       Patient ID: Crista Momin is a 33 y.o. female.    Chief Complaint:  No chief complaint on file.      History of Present Illness  HPI  Annual Exam-Premenopausal  Patient presents for annual exam. The patient has no complaints today. The patient is not currently sexually active.-- condoms ; GYN screening history: last pap: patient has never had a pap test. The patient wears seatbelts: yes. The patient participates in regular exercise: no. Has the patient ever been transfused or tattooed?: yes.+tattooes; +transfused;  The patient reports that there is not domestic violence in her life.  Menses monthly, flow 7 days, overnight pads; change q3-4 hrs; most recent menses last; 14 days--hosp for hgb 1.6; received 5 units prbc, home on provera 10 bid    No problems sleeping        GYN & OB History  Patient's last menstrual period was 2023 (exact date).   Date of Last Pap: No result found    OB History    Para Term  AB Living   0 0 0 0 0 0   SAB IAB Ectopic Multiple Live Births   0 0 0 0 0       Review of Systems  Review of Systems   Genitourinary:  Positive for menorrhagia and menstrual problem.   All other systems reviewed and are negative.          Objective:      Physical Exam:   Constitutional: She is oriented to person, place, and time. She appears well-developed and well-nourished.     Eyes: Pupils are equal, round, and reactive to light. Conjunctivae and EOM are normal.      Pulmonary/Chest: Effort normal. Right breast exhibits no mass, no nipple discharge, no skin change and no tenderness. Left breast exhibits no mass, no nipple discharge, no skin change and no tenderness. Breasts are symmetrical.        Abdominal: Soft.     Genitourinary:    Vagina, right adnexa, left adnexa and rectum normal.      Pelvic exam was performed with patient supine.   The external female genitalia was normal.   Labial bartholins normal.Cervix is normal. Right adnexum displays no mass and no  tenderness. Left adnexum displays no mass and no tenderness. No erythema,  no vaginal discharge, bleeding, rectocele, cystocele or unspecified prolapse of vaginal walls in the vagina. Vagina was moist.   pap smear completedUterus is enlarged, hosting fibroids and uterine contour abnormal . Normal urethral meatus.Urethra findings: no urethral massBladder findings: no bladder distention and no bladder tenderness          Musculoskeletal: Normal range of motion and moves all extremeties.       Neurological: She is alert and oriented to person, place, and time.    Skin: Skin is warm.    Psychiatric: She has a normal mood and affect. Her behavior is normal.           Assessment:     Encounter Diagnoses   Name Primary?    Iron deficiency anemia due to chronic blood loss     Menorrhagia with regular cycle     Fibroids, submucosal     Encounter for gynecological examination (general) (routine) without abnormal findings Yes    Screening for cervical cancer              Plan:      Continue annual well woman exam.  Pap today; Reviewed updated recommendations for pap smears (every 3 years) in low risk patients.   Recommend annual pelvic exams.  Reviewed recommendations for annual CBE.  Aware mammo due age 38  Reviewed options for fibroids/menorrhagia--orlissa, depo provera, uae, myosure, myomectomy, hysterectomy  Pt with undesired fertiliy; pamphlets given on myosure/myomectomy  Cotninue provera bid ; continue daily iron  Preop consultation in 2 wks

## 2023-08-18 LAB
ESTIMATED AVG GLUCOSE: 94 MG/DL (ref 68–131)
FERRITIN SERPL-MCNC: 133 NG/ML (ref 20–300)
HBA1C MFR BLD: 4.9 % (ref 4–5.6)
IRON SERPL-MCNC: 52 UG/DL (ref 30–160)
RPR SER QL: NORMAL
SATURATED IRON: 11 % (ref 20–50)
TOTAL IRON BINDING CAPACITY: 459 UG/DL (ref 250–450)
TRANSFERRIN SERPL-MCNC: 310 MG/DL (ref 200–375)

## 2023-08-20 NOTE — DISCHARGE SUMMARY
[facility]  Discharge Summary      Patient Name: Crista Momin  MRN: [id number]  JONO: [id number]  Patient Class: IP- Inpatient  Admission Date: 8/12/2023  Hospital Length of Stay: 2 days  Discharge Date and Time: 8/14/2023 12:46 PM  Attending Physician: No att. providers found   Discharging Provider: Tere Franz MD  Primary Care Provider: HENRY Thompson MD    Primary Care Team: [facility]    HPI:   Information obtained from chart review and discussion with patient.      33-year-old female with no known medical history as she does not seek routine medical care who presented to ED 8/12/2023 for evaluation of dizziness. ED evaluation revealing H/H 1.7/6.4. Hemodynamics stable. She was typed and screen and 3 units PRBC were ordered. Patient noted heavy, extended menses this month. GYN and Hematology consulted. Critical care consulted for close hemodynamic monitoring during transfusion with critically low H/H.     Upon exam, Ms. Momin looks surprisingly non-toxic and comfortable. She has no known medical history as she has not seen any type of provider since her early 20s. She does not take any medications on a regular basis. She denies vaping, tobacco use, illicit drug use, and ETOH use. She has regular menstrual cycles that typically last 7 days with the first 3-4 days being heavier with clots. This month, her period lasted 12 days and was fairly heavy for about 7 of those days. She has been experiencing dizziness and lightheadedness, mostly when changing positions, for the past 10 days. She has been having intermittent SOB (after walking about 50 ft.) since May 2023. She used to eat baking flour but stopped about a year ago. In the past few months she has been eating a lot of ice. She denies syncope.       * No surgery found *      Hospital Course:   8/13: H/H 6.4/20.4 following 5 units PRBC. No issues during transfusions. Work up thus far showing severe  SHANI, no coagulapathy. Pelvic US reviewed by GYN - fibroids. Starting provera. Patient reports ongoing menses, day #13- soiled 2 pads overnight and 1 today. Hemodynamics remain stable. No dizziness when getting up to toilet. Hematology evaluated and ordered additional labs. Stable for transfer out of ICU  Patient's hemoglobin joe to 6.4 after 5 units.  Her iron saturation was low and therefore she was given 2 doses of Venofer 200 mg IV.  She denies any symptomatic complaints and states she is feeling much better.  Patient seen and examined on day of discharge she is stable for discharge home.  She will follow up with gyn, remain on her Provera, follow up with PCP.         Goals of Care Treatment Preferences:  Code Status: Full Code      Consults:   Consults (From admission, onward)        Status Ordering Provider     Inpatient consult to Social Work  Once        Provider:  (Not yet assigned)    Completed DUGLAS SANCHEZ     Inpatient consult to Hematology  Once        Provider:  Leyla Luque MD    Completed SHAN GREENBERG          Renal/  Fibroids, submucosal  Follow up with gyn      Menorrhagia with regular cycle  Reports regular menses with heavy blood flow 3-4 out of 7 days; however this month period now on day #13  with 5-7 heavy flow days   Pelvic US revealing fibroid  Has been started on Provera  If bleeding does not stop- options include uterine artery embolization vs surgery   Will follow-up with Dr. Tobin in clinic      Oncology  Iron deficiency anemia due to chronic blood loss  Labs consistent with severe SHANI   Initiated on IV iron 200 mg daily x 2 days   Folate   Von Willerbran Profile and hemoglobin electrophoresis pending  Follow up outpatient        Final Active Diagnoses:    Diagnosis Date Noted POA    Fibroids, submucosal [D25.0] 08/13/2023 Yes     Chronic    Menorrhagia with regular cycle [N92.0] 08/12/2023 Yes     Chronic    Iron deficiency anemia due to chronic blood loss [D50.0]  08/12/2023 Yes     Chronic      Problems Resolved During this Admission:    Diagnosis Date Noted Date Resolved POA    PRINCIPAL PROBLEM:  Symptomatic anemia [D64.9] 08/12/2023 08/16/2023 Yes       Discharged Condition: fair    Disposition: Home or Self Care    Follow Up:   Follow-up Information     Alba Tobin MD. Go on 8/17/2023.    Specialty: Obstetrics and Gynecology  Why: [place] office (hosp f/u)  Contact information:  [address] [place] [id number] [phone]             Unable, To Obtain Follow up in 2 week(s).                     Patient Instructions:      Ambulatory referral/consult to [facility]   Standing Status: Future   Referral Priority: Routine Referral Type: Consultation   Referral Reason: Specialty Services Required   Number of Visits Requested: 1     Diet Adult Regular     Notify your health care provider if you experience any of the following:  temperature >100.4     Notify your health care provider if you experience any of the following:  severe uncontrolled pain     Notify your health care provider if you experience any of the following:  difficulty breathing or increased cough     Notify your health care provider if you experience any of the following:  persistent dizziness, light-headedness, or visual disturbances     Activity as tolerated     Imaging Results          US Pelvis Comp with Transvag NON-OB (xpd) (Final result)  Result time 08/12/23 16:56:05   Procedure changed from US Pelvis Complete Non OB     Final result by Luz Elena Barcenas MD (08/12/23 16:56:05)                 Impression:      Leiomyomatous uterus likely      Electronically signed by: Luz Elena Barcenas  Date:    08/12/2023  Time:    16:56             Narrative:    EXAMINATION:  US PELVIS COMP WITH TRANSVAG NON-OB (XPD)    CLINICAL HISTORY:  menorrhagia with severe anemia;    TECHNIQUE:  Transabdominal sonography of the pelvis was performed, followed by transvaginal sonography to better evaluate  the uterus and ovaries.    COMPARISON:  None.    FINDINGS:  Uterine length up to 10 cm.  Uterine leiomyomata suspected largest measurable lesion 4 cm.  Endometrial stripe not discriminated possibly distorted by fibroid submucosal.  Ovaries unremarkable with positive Doppler flow.  No sizable ascites or fluid collection                                  Significant Diagnostic Studies: Labs: All labs within the past 24 hours have been reviewed    Pending Diagnostic Studies:     None         Medications:  Reconciled Home Medications:      Medication List      START taking these medications    folic acid 1 MG tablet  Commonly known as: FOLVITE  Take 1 tablet (1 mg total) by mouth once daily.            Indwelling Lines/Drains at time of discharge:   Lines/Drains/Airways     None                 Time spent on the discharge of patient: 36 minutes         Tere Franz MD  Department of Hospital Medicine  [facility] - Med S[facility]

## 2023-08-20 NOTE — ASSESSMENT & PLAN NOTE
Labs consistent with severe SHANI   Initiated on IV iron 200 mg daily x 2 days   Folate   Von Willerbran Profile and hemoglobin electrophoresis pending  Follow up outpatient

## 2023-08-21 ENCOUNTER — PATIENT MESSAGE (OUTPATIENT)
Dept: RESEARCH | Facility: HOSPITAL | Age: 33
End: 2023-08-21
Payer: COMMERCIAL

## 2023-08-23 ENCOUNTER — PATIENT MESSAGE (OUTPATIENT)
Dept: INTERNAL MEDICINE | Facility: CLINIC | Age: 33
End: 2023-08-23
Payer: COMMERCIAL

## 2023-08-23 DIAGNOSIS — D50.0 IRON DEFICIENCY ANEMIA DUE TO CHRONIC BLOOD LOSS: Primary | Chronic | ICD-10-CM

## 2023-08-23 DIAGNOSIS — D72.829 LEUKOCYTOSIS, UNSPECIFIED TYPE: ICD-10-CM

## 2023-08-23 NOTE — TELEPHONE ENCOUNTER
TO MY TEAM:   I sent Crista the Patient Portal message below. Please contact Crista to relay message or at least verify that she read and understood the message.  Schedule CBC in [place] immediately after her GYN appointment with Dr. Tobin in [place], scheduled for Thursday, August 31st at 9:15 AM.  --------------------------------------------------------------------------------  HiCrista.     Your anemia is improved, and your cholesterol levels are excellent.     Your white blood cell count is elevated, which may be related to your recent transfusions.     I'm asking my staff to schedule you to have a repeat complete blood count lab to be done immediately after your GYN appointment with Dr. Tobin in [place], scheduled for Thursday, August 31st at 9:15 AM.     Let me know if I can do anything else for you.     All the best,     CLAUDY Thompson MD    Orders Placed This Encounter   Procedures    CBC Auto Differential

## 2023-08-23 NOTE — PROGRESS NOTES
Transitional Care Note 8/16/23    Family and/or Caretaker present at visit?  No.  Diagnostic tests reviewed/disposition: I have reviewed all completed as well as pending diagnostic tests at the time of discharge.  Disease/illness education: Anemia  Home health/community services discussion/referrals: Patient does not have home health established from hospital visit.  They do not need home health.  If needed, we will set up home health for the patient.   Establishment or re-establishment of referral orders for community resources: No other necessary community resources. They already have GYN appointment scheduled on Thursday, August 17, 2023 at 11:15 AM with Alba Tobin MD  Discussion with other health care providers: No discussion with other health care providers necessary.

## 2023-08-23 NOTE — PROGRESS NOTES
TO MY TEAM:   I sent Crista the Patient Portal message below. Please contact Crista to relay message or at least verify that she read and understood the message.  Schedule CBC in [place] immediately after her GYN appointment with Dr. Tobin in [place], scheduled for Thursday, August 31st at 9:15 AM.  --------------------------------------------------------------------------------  Crista Bateman.    Your anemia is improved, and your cholesterol levels are excellent.    Your white blood cell count is elevated, which may be related to your recent transfusions.    I'm asking my staff to schedule you to have a repeat complete blood count lab to be done immediately after your GYN appointment with Dr. Tobin in [place], scheduled for Thursday, August 31st at 9:15 AM.    Let me know if I can do anything else for you.    All the best,    CLAUDY Thompson MD

## 2023-08-24 NOTE — PROGRESS NOTES
Good News! Your pap smear came back and it was normal.  The HPV is also negative.   I still recommend doing a pelvic exam and annual visit every year, but you only need the pap test every 3 years. Please call me if you have any further questions.   Sincerely,   Dr. Tobin

## 2023-08-31 ENCOUNTER — LAB VISIT (OUTPATIENT)
Dept: LAB | Facility: HOSPITAL | Age: 33
End: 2023-08-31
Attending: FAMILY MEDICINE
Payer: COMMERCIAL

## 2023-08-31 ENCOUNTER — OFFICE VISIT (OUTPATIENT)
Dept: OBSTETRICS AND GYNECOLOGY | Facility: CLINIC | Age: 33
End: 2023-08-31
Payer: COMMERCIAL

## 2023-08-31 VITALS
BODY MASS INDEX: 40.12 KG/M2 | SYSTOLIC BLOOD PRESSURE: 124 MMHG | HEIGHT: 63 IN | WEIGHT: 226.44 LBS | DIASTOLIC BLOOD PRESSURE: 70 MMHG

## 2023-08-31 DIAGNOSIS — D50.0 IRON DEFICIENCY ANEMIA DUE TO CHRONIC BLOOD LOSS: Primary | ICD-10-CM

## 2023-08-31 DIAGNOSIS — D25.0 FIBROIDS, SUBMUCOSAL: ICD-10-CM

## 2023-08-31 DIAGNOSIS — D72.829 LEUKOCYTOSIS, UNSPECIFIED TYPE: ICD-10-CM

## 2023-08-31 DIAGNOSIS — D25.0 FIBROIDS, SUBMUCOSAL: Primary | ICD-10-CM

## 2023-08-31 DIAGNOSIS — N92.0 MENORRHAGIA WITH REGULAR CYCLE: ICD-10-CM

## 2023-08-31 DIAGNOSIS — D50.0 IRON DEFICIENCY ANEMIA DUE TO CHRONIC BLOOD LOSS: Chronic | ICD-10-CM

## 2023-08-31 DIAGNOSIS — D50.0 IRON DEFICIENCY ANEMIA DUE TO CHRONIC BLOOD LOSS: ICD-10-CM

## 2023-08-31 PROCEDURE — 99213 PR OFFICE/OUTPT VISIT, EST, LEVL III, 20-29 MIN: ICD-10-PCS | Mod: S$GLB,,, | Performed by: OBSTETRICS & GYNECOLOGY

## 2023-08-31 PROCEDURE — 99999 PR PBB SHADOW E&M-EST. PATIENT-LVL III: ICD-10-PCS | Mod: PBBFAC,,, | Performed by: OBSTETRICS & GYNECOLOGY

## 2023-08-31 PROCEDURE — 3044F PR MOST RECENT HEMOGLOBIN A1C LEVEL <7.0%: ICD-10-PCS | Mod: CPTII,S$GLB,, | Performed by: OBSTETRICS & GYNECOLOGY

## 2023-08-31 PROCEDURE — 1111F DSCHRG MED/CURRENT MED MERGE: CPT | Mod: CPTII,S$GLB,, | Performed by: OBSTETRICS & GYNECOLOGY

## 2023-08-31 PROCEDURE — 3044F HG A1C LEVEL LT 7.0%: CPT | Mod: CPTII,S$GLB,, | Performed by: OBSTETRICS & GYNECOLOGY

## 2023-08-31 PROCEDURE — 99999 PR PBB SHADOW E&M-EST. PATIENT-LVL III: CPT | Mod: PBBFAC,,, | Performed by: OBSTETRICS & GYNECOLOGY

## 2023-08-31 PROCEDURE — 3078F DIAST BP <80 MM HG: CPT | Mod: CPTII,S$GLB,, | Performed by: OBSTETRICS & GYNECOLOGY

## 2023-08-31 PROCEDURE — 1159F PR MEDICATION LIST DOCUMENTED IN MEDICAL RECORD: ICD-10-PCS | Mod: CPTII,S$GLB,, | Performed by: OBSTETRICS & GYNECOLOGY

## 2023-08-31 PROCEDURE — 1159F MED LIST DOCD IN RCRD: CPT | Mod: CPTII,S$GLB,, | Performed by: OBSTETRICS & GYNECOLOGY

## 2023-08-31 PROCEDURE — 3074F PR MOST RECENT SYSTOLIC BLOOD PRESSURE < 130 MM HG: ICD-10-PCS | Mod: CPTII,S$GLB,, | Performed by: OBSTETRICS & GYNECOLOGY

## 2023-08-31 PROCEDURE — 3008F PR BODY MASS INDEX (BMI) DOCUMENTED: ICD-10-PCS | Mod: CPTII,S$GLB,, | Performed by: OBSTETRICS & GYNECOLOGY

## 2023-08-31 PROCEDURE — 3078F PR MOST RECENT DIASTOLIC BLOOD PRESSURE < 80 MM HG: ICD-10-PCS | Mod: CPTII,S$GLB,, | Performed by: OBSTETRICS & GYNECOLOGY

## 2023-08-31 PROCEDURE — 85025 COMPLETE CBC W/AUTO DIFF WBC: CPT | Performed by: FAMILY MEDICINE

## 2023-08-31 PROCEDURE — 99213 OFFICE O/P EST LOW 20 MIN: CPT | Mod: S$GLB,,, | Performed by: OBSTETRICS & GYNECOLOGY

## 2023-08-31 PROCEDURE — 3008F BODY MASS INDEX DOCD: CPT | Mod: CPTII,S$GLB,, | Performed by: OBSTETRICS & GYNECOLOGY

## 2023-08-31 PROCEDURE — 36415 COLL VENOUS BLD VENIPUNCTURE: CPT | Mod: PO | Performed by: FAMILY MEDICINE

## 2023-08-31 PROCEDURE — 3074F SYST BP LT 130 MM HG: CPT | Mod: CPTII,S$GLB,, | Performed by: OBSTETRICS & GYNECOLOGY

## 2023-08-31 PROCEDURE — 1111F PR DISCHARGE MEDS RECONCILED W/ CURRENT OUTPATIENT MED LIST: ICD-10-PCS | Mod: CPTII,S$GLB,, | Performed by: OBSTETRICS & GYNECOLOGY

## 2023-08-31 NOTE — PROGRESS NOTES
"History & Physical    SUBJECTIVE:     History of Present Illness:  Patient is a 33 y.o. female presents with heavy menstrual cyles requiring blood transfusions.  Sono shows largest fibroid measures 4 cm, submucous; mostly amenorrheic with provera    Chief Complaint   Patient presents with    Pre-op Exam       Review of patient's allergies indicates:  No Known Allergies    Current Outpatient Medications   Medication Sig Dispense Refill    folic acid (FOLVITE) 1 MG tablet Take 1 tablet (1 mg total) by mouth once daily. 30 tablet 0    medroxyPROGESTERone (PROVERA) 10 MG tablet Take 1 tablet (10 mg total) by mouth 2 (two) times a day. 60 tablet 1     No current facility-administered medications for this visit.       History reviewed. No pertinent past medical history.  History reviewed. No pertinent surgical history.  Family History   Problem Relation Age of Onset    No Known Problems Mother     No Known Problems Father     Cancer Sister      Social History     Tobacco Use    Smoking status: Never    Smokeless tobacco: Never   Substance Use Topics    Alcohol use: Never    Drug use: Never        Review of Systems:  Review of Systems   Genitourinary:  Positive for menstrual problem.   All other systems reviewed and are negative.      OBJECTIVE:     Vital Signs (Most Recent)  BP: 124/70 (08/31/23 0918)  5' 3" (1.6 m)  102.7 kg (226 lb 6.6 oz)     Physical Exam:  Physical Exam  Vitals reviewed.   Constitutional:       Appearance: She is well-developed.   HENT:      Head: Normocephalic.   Eyes:      Conjunctiva/sclera: Conjunctivae normal.      Pupils: Pupils are equal, round, and reactive to light.   Cardiovascular:      Rate and Rhythm: Normal rate.   Pulmonary:      Effort: Pulmonary effort is normal.      Breath sounds: Normal breath sounds.   Abdominal:      Palpations: Abdomen is soft.   Musculoskeletal:         General: Normal range of motion.      Cervical back: Normal range of motion.   Skin:     General: Skin is " warm and dry.   Neurological:      Mental Status: She is alert and oriented to person, place, and time.   Psychiatric:         Behavior: Behavior normal.         Thought Content: Thought content normal.         Judgment: Judgment normal.         Laboratory  Cbc, bmp, hcg to be drawn preop    Diagnostic Results:  US: Reviewed    FINDINGS:  Uterine length up to 10 cm.  Uterine leiomyomata suspected largest measurable lesion 4 cm.  Endometrial stripe not discriminated possibly distorted by fibroid submucosal.    ASSESSMENT/PLAN:     Encounter Diagnoses   Name Primary?    Iron deficiency anemia due to chronic blood loss     Fibroids, submucosal Yes         PLAN:Plan   Reviewed hysteroscopy with myosure procedure in detail.  Reviewed risks including but not limited to infection, bleeding, damage to bowel/bladder, cva;htn, dvt, death.    All questions answered to the best of my ability.  Alternatives reviewed --continued observation, continue provera, abdominal myomectomy,  hystectomy.  Pt wishes to proceed with hysteroscopy with myosure  Consents signed witnessed  Post op course reviewed  Case request submitted

## 2023-09-01 LAB
BASOPHILS # BLD AUTO: 0.06 K/UL (ref 0–0.2)
BASOPHILS NFR BLD: 0.8 % (ref 0–1.9)
DIFFERENTIAL METHOD: ABNORMAL
EOSINOPHIL # BLD AUTO: 0.1 K/UL (ref 0–0.5)
EOSINOPHIL NFR BLD: 1.7 % (ref 0–8)
ERYTHROCYTE [DISTWIDTH] IN BLOOD BY AUTOMATED COUNT: 21.4 % (ref 11.5–14.5)
HCT VFR BLD AUTO: 30.4 % (ref 37–48.5)
HGB BLD-MCNC: 8.4 G/DL (ref 12–16)
IMM GRANULOCYTES # BLD AUTO: 0.01 K/UL (ref 0–0.04)
IMM GRANULOCYTES NFR BLD AUTO: 0.1 % (ref 0–0.5)
LYMPHOCYTES # BLD AUTO: 1.4 K/UL (ref 1–4.8)
LYMPHOCYTES NFR BLD: 19.1 % (ref 18–48)
MCH RBC QN AUTO: 23.7 PG (ref 27–31)
MCHC RBC AUTO-ENTMCNC: 27.6 G/DL (ref 32–36)
MCV RBC AUTO: 86 FL (ref 82–98)
MONOCYTES # BLD AUTO: 0.6 K/UL (ref 0.3–1)
MONOCYTES NFR BLD: 8.7 % (ref 4–15)
NEUTROPHILS # BLD AUTO: 5.1 K/UL (ref 1.8–7.7)
NEUTROPHILS NFR BLD: 69.6 % (ref 38–73)
NRBC BLD-RTO: 0 /100 WBC
PLATELET # BLD AUTO: 758 K/UL (ref 150–450)
PMV BLD AUTO: 9.9 FL (ref 9.2–12.9)
RBC # BLD AUTO: 3.55 M/UL (ref 4–5.4)
WBC # BLD AUTO: 7.26 K/UL (ref 3.9–12.7)

## 2023-10-02 ENCOUNTER — TELEPHONE (OUTPATIENT)
Dept: PREADMISSION TESTING | Facility: HOSPITAL | Age: 33
End: 2023-10-02
Payer: COMMERCIAL

## 2023-10-02 NOTE — TELEPHONE ENCOUNTER
Pre op instructions reviewed with Pt,verbalized understanding.    To confirm, Surgery is scheduled on 10/04/23. We will call you late afternoon the business day prior to surgery with your arrival time.    *Please report to the [facility] Lobby (1st Floor) located off of [facility] (2nd Entrance/Building on the left, in front of the flag pole).  Address: [address] [place] [address] [place] [address]      INSTRUCTIONS IMPORTANT!!!  DO NOT Eat, Drink, or Smoke after 12 midnight unless instructed otherwise by your Surgeon. OK to brush teeth, no gum, candy or mints!    >>>MEDICATION INSTRUCTIONS<<<: Morning of Surgery, take small sip of water with ONLY these medications:  None      *Diabetic Patients: If you take diabetic or weight loss medication, Do NOT take morning of surgery unless instructed by Doctor. Metformin to be stopped 24 hrs prior to surgery. Ozempic/ Mounjaro/ Wegovy/ Trulicity/ Semaglutide injections or weight loss medication to be stopped 7 days prior to surgery. DO NOT take long-acting insulin the evening before surgery. Blood sugars will be checked in pre-op by Nurse.    *Patients should HOLD all vitamins, herbal supplements, weight loss medication, aspirin products & NSAIDS 7 days prior to surgery, as these can thin the blood. Ok to take Tylenol.    ____  Avoid Alcoholic beverages 3 days prior to surgery, as it can thin the blood.  ____  NO Acrylic/fake nails or nail polish worn day of surgery (specifically hand/arm & foot surgeries).  ____  NO powder, lotions, deodorants, oils or cream on body.  ____  Remove all jewelry & piercings & foreign objects before arrival & leave at home.  ____  Remove Dentures, Hearing Aids & Contact Lens prior to surgery.  ____  Bring photo ID and insurance information to hospital (Leave Valuables at Home).  ____  If going home the same day, arrange for a ride home. You will not be able to drive for 24 hrs if Anesthesia was used.   ____  Females (ages 11-60): may  need to give a urine sample the morning of surgery; please see Pre op Nurse prior to using the restroom.  ____  Males: Stop ED medications (Viagra, Cialis) 24 hrs prior to surgery.  ____  Wear clean, loose fitting clothing to allow for dressings/ bandages.      Bathing Instructions:    -Shower with anti-bacterial Soap (Hibiclens or Dial) the night before surgery and the morning of.   -Do not use Hibiclens on your face or genitals.   -Apply clean clothes after shower.  -Do not shave your face or body 2 days prior to surgery unless instructed otherwise by your Surgeon.  -Do not shave pubic hair 7 days prior to surgery (gyn pt's).    [facility] Visitor/Ride Policy:  Only 2 adults allowed in pre op/recovery area during your procedure. You MUST HAVE A RIDE HOME from a responsible adult that you know and trust. Medical Transport, Uber or Lyft can ONLY be used if patient has a responsible adult to accompany them during ride home.    Discharge Instructions: You will receive Post-op/Discharge instructions by your Discharge Nurse prior to going home.   *Prevention of surgical site infections:   -Keep incisions clean and dry.   -Do not soak/submerge incisions in water until completely healed.   -Do not apply lotions, powders, creams, or deodorants to site.   -Always make sure hands are cleaned with antibacterial soap/ alcohol-based  prior to touching the surgical site.        *Signs and symptoms of Infection:               -Redness and pain around the area where you had surgery               -Drainage of cloudy fluid from your surgical wound               -Fever, chills or any flu-like symptoms     >>>Call Surgeon office/on-call Surgeon if you experience any of these signs & symptoms before or after surgery @ [phone]<<<       *If you are running late or have questions the morning of surgery, please call the Hospital Surgery Dept @ [phone].     *Billing questions:  [phone]       Thank  you,  -[facility] Pre Admit Dept.  [phone] or [phone]  M-F 7:30 am-4:00 pm (Closed Major Holidays)

## 2023-10-03 ENCOUNTER — ANESTHESIA EVENT (OUTPATIENT)
Dept: SURGERY | Facility: HOSPITAL | Age: 33
End: 2023-10-03
Payer: COMMERCIAL

## 2023-10-03 ENCOUNTER — TELEPHONE (OUTPATIENT)
Dept: PREADMISSION TESTING | Facility: HOSPITAL | Age: 33
End: 2023-10-03
Payer: COMMERCIAL

## 2023-10-03 NOTE — TELEPHONE ENCOUNTER
Called and spoke with Pt about the following:     Please arrive to [facility] ([facility] at 7:45 am on 10/04/23 for your scheduled procedure.  Address: [address] [place] [address] [place] [address] (2nd Building on left, 1st Floor Lobby)  >>>NO eating or drinking after midnight unless instructed otherwise by your Surgeon<<<

## 2023-10-04 ENCOUNTER — ANESTHESIA (OUTPATIENT)
Dept: SURGERY | Facility: HOSPITAL | Age: 33
End: 2023-10-04
Payer: COMMERCIAL

## 2023-10-04 ENCOUNTER — HOSPITAL ENCOUNTER (OUTPATIENT)
Facility: HOSPITAL | Age: 33
Discharge: HOME | End: 2023-10-04
Attending: OBSTETRICS & GYNECOLOGY | Admitting: OBSTETRICS & GYNECOLOGY
Payer: COMMERCIAL

## 2023-10-04 DIAGNOSIS — Z98.890 STATUS POST HYSTEROSCOPY: Primary | ICD-10-CM

## 2023-10-04 DIAGNOSIS — N92.0 MENORRHAGIA: ICD-10-CM

## 2023-10-04 LAB
B-HCG UR QL: NEGATIVE
CTP QC/QA: YES

## 2023-10-04 PROCEDURE — 37000009 HC ANESTHESIA EA ADD 15 MINS: Performed by: OBSTETRICS & GYNECOLOGY

## 2023-10-04 PROCEDURE — 58561 HYSTEROSCOPY REMOVE MYOMA: CPT | Mod: ,,, | Performed by: OBSTETRICS & GYNECOLOGY

## 2023-10-04 PROCEDURE — 25000003 PHARM REV CODE 250: Performed by: NURSE ANESTHETIST, CERTIFIED REGISTERED

## 2023-10-04 PROCEDURE — 88305 TISSUE EXAM BY PATHOLOGIST: CPT | Performed by: PATHOLOGY

## 2023-10-04 PROCEDURE — 25000003 PHARM REV CODE 250: Performed by: OBSTETRICS & GYNECOLOGY

## 2023-10-04 PROCEDURE — 63600175 PHARM REV CODE 636 W HCPCS: Performed by: NURSE ANESTHETIST, CERTIFIED REGISTERED

## 2023-10-04 PROCEDURE — 36000707: Performed by: OBSTETRICS & GYNECOLOGY

## 2023-10-04 PROCEDURE — 71000033 HC RECOVERY, INTIAL HOUR: Performed by: OBSTETRICS & GYNECOLOGY

## 2023-10-04 PROCEDURE — 71000039 HC RECOVERY, EACH ADD'L HOUR: Performed by: OBSTETRICS & GYNECOLOGY

## 2023-10-04 PROCEDURE — 58561 PR HYSTEROSCOPY,RMV MYOMA: ICD-10-PCS | Mod: ,,, | Performed by: OBSTETRICS & GYNECOLOGY

## 2023-10-04 PROCEDURE — 63600175 PHARM REV CODE 636 W HCPCS: Performed by: OBSTETRICS & GYNECOLOGY

## 2023-10-04 PROCEDURE — 27201423 OPTIME MED/SURG SUP & DEVICES STERILE SUPPLY: Performed by: OBSTETRICS & GYNECOLOGY

## 2023-10-04 PROCEDURE — 71000015 HC POSTOP RECOV 1ST HR: Performed by: OBSTETRICS & GYNECOLOGY

## 2023-10-04 PROCEDURE — 88305 TISSUE EXAM BY PATHOLOGIST: ICD-10-PCS | Mod: 26,,, | Performed by: PATHOLOGY

## 2023-10-04 PROCEDURE — 36000706: Performed by: OBSTETRICS & GYNECOLOGY

## 2023-10-04 PROCEDURE — 81025 URINE PREGNANCY TEST: CPT | Performed by: OBSTETRICS & GYNECOLOGY

## 2023-10-04 PROCEDURE — 37000008 HC ANESTHESIA 1ST 15 MINUTES: Performed by: OBSTETRICS & GYNECOLOGY

## 2023-10-04 PROCEDURE — 88305 TISSUE EXAM BY PATHOLOGIST: CPT | Mod: 26,,, | Performed by: PATHOLOGY

## 2023-10-04 RX ORDER — VASOPRESSIN 20 [USP'U]/ML
INJECTION, SOLUTION INTRAMUSCULAR; SUBCUTANEOUS
Status: DISCONTINUED | OUTPATIENT
Start: 2023-10-04 | End: 2023-10-04 | Stop reason: HOSPADM

## 2023-10-04 RX ORDER — SODIUM CHLORIDE 9 MG/ML
INJECTION, SOLUTION INTRAVENOUS CONTINUOUS
Status: DISCONTINUED | OUTPATIENT
Start: 2023-10-04 | End: 2024-01-08

## 2023-10-04 RX ORDER — SUCCINYLCHOLINE CHLORIDE 20 MG/ML
INJECTION INTRAMUSCULAR; INTRAVENOUS
Status: DISCONTINUED | OUTPATIENT
Start: 2023-10-04 | End: 2023-10-04

## 2023-10-04 RX ORDER — FENTANYL CITRATE 50 UG/ML
INJECTION, SOLUTION INTRAMUSCULAR; INTRAVENOUS
Status: DISCONTINUED | OUTPATIENT
Start: 2023-10-04 | End: 2023-10-04

## 2023-10-04 RX ORDER — PROCHLORPERAZINE EDISYLATE 5 MG/ML
5 INJECTION INTRAMUSCULAR; INTRAVENOUS EVERY 6 HOURS PRN
Status: DISCONTINUED | OUTPATIENT
Start: 2023-10-04 | End: 2023-10-04 | Stop reason: HOSPADM

## 2023-10-04 RX ORDER — MIDAZOLAM HYDROCHLORIDE 1 MG/ML
INJECTION INTRAMUSCULAR; INTRAVENOUS
Status: DISCONTINUED | OUTPATIENT
Start: 2023-10-04 | End: 2023-10-04

## 2023-10-04 RX ORDER — CHLORHEXIDINE GLUCONATE ORAL RINSE 1.2 MG/ML
10 SOLUTION DENTAL
Status: DISCONTINUED | OUTPATIENT
Start: 2023-10-04 | End: 2024-01-08

## 2023-10-04 RX ORDER — ONDANSETRON 2 MG/ML
4 INJECTION INTRAMUSCULAR; INTRAVENOUS DAILY PRN
Status: DISCONTINUED | OUTPATIENT
Start: 2023-10-04 | End: 2023-10-04 | Stop reason: HOSPADM

## 2023-10-04 RX ORDER — LIDOCAINE HYDROCHLORIDE 20 MG/ML
INJECTION, SOLUTION EPIDURAL; INFILTRATION; INTRACAUDAL; PERINEURAL
Status: DISCONTINUED | OUTPATIENT
Start: 2023-10-04 | End: 2023-10-04

## 2023-10-04 RX ORDER — HYDROCODONE BITARTRATE AND ACETAMINOPHEN 5; 325 MG/1; MG/1
1 TABLET ORAL EVERY 4 HOURS PRN
Status: DISCONTINUED | OUTPATIENT
Start: 2023-10-04 | End: 2023-10-04 | Stop reason: HOSPADM

## 2023-10-04 RX ORDER — DIPHENHYDRAMINE HYDROCHLORIDE 50 MG/ML
25 INJECTION INTRAMUSCULAR; INTRAVENOUS EVERY 4 HOURS PRN
Status: DISCONTINUED | OUTPATIENT
Start: 2023-10-04 | End: 2023-10-04 | Stop reason: HOSPADM

## 2023-10-04 RX ORDER — OXYCODONE AND ACETAMINOPHEN 5; 325 MG/1; MG/1
1 TABLET ORAL
Status: DISCONTINUED | OUTPATIENT
Start: 2023-10-04 | End: 2023-10-04 | Stop reason: HOSPADM

## 2023-10-04 RX ORDER — IBUPROFEN 800 MG/1
800 TABLET ORAL EVERY 8 HOURS PRN
Qty: 30 TABLET | Refills: 0 | Status: SHIPPED | OUTPATIENT
Start: 2023-10-04 | End: 2023-10-14

## 2023-10-04 RX ORDER — HYDROCODONE BITARTRATE AND ACETAMINOPHEN 5; 325 MG/1; MG/1
1 TABLET ORAL EVERY 4 HOURS PRN
Qty: 8 TABLET | Refills: 0 | Status: SHIPPED | OUTPATIENT
Start: 2023-10-04 | End: 2023-10-24

## 2023-10-04 RX ORDER — KETOROLAC TROMETHAMINE 30 MG/ML
INJECTION, SOLUTION INTRAMUSCULAR; INTRAVENOUS
Status: DISCONTINUED | OUTPATIENT
Start: 2023-10-04 | End: 2023-10-04

## 2023-10-04 RX ORDER — HYDROMORPHONE HYDROCHLORIDE 2 MG/ML
0.2 INJECTION, SOLUTION INTRAMUSCULAR; INTRAVENOUS; SUBCUTANEOUS EVERY 5 MIN PRN
Status: DISCONTINUED | OUTPATIENT
Start: 2023-10-04 | End: 2023-10-04 | Stop reason: HOSPADM

## 2023-10-04 RX ORDER — CEFAZOLIN SODIUM 2 G/50ML
2 SOLUTION INTRAVENOUS
Status: DISCONTINUED | OUTPATIENT
Start: 2023-10-04 | End: 2024-01-08

## 2023-10-04 RX ORDER — ACETAMINOPHEN 500 MG
1000 TABLET ORAL
Status: COMPLETED | OUTPATIENT
Start: 2023-10-04 | End: 2023-10-04

## 2023-10-04 RX ORDER — FAMOTIDINE 20 MG/1
20 TABLET, FILM COATED ORAL
Status: COMPLETED | OUTPATIENT
Start: 2023-10-04 | End: 2023-10-04

## 2023-10-04 RX ORDER — ONDANSETRON 8 MG/1
8 TABLET, ORALLY DISINTEGRATING ORAL EVERY 8 HOURS PRN
Status: DISCONTINUED | OUTPATIENT
Start: 2023-10-04 | End: 2023-10-04 | Stop reason: HOSPADM

## 2023-10-04 RX ORDER — PHENYLEPHRINE HYDROCHLORIDE 10 MG/ML
INJECTION INTRAVENOUS
Status: DISCONTINUED | OUTPATIENT
Start: 2023-10-04 | End: 2023-10-04

## 2023-10-04 RX ORDER — CEFAZOLIN SODIUM 1 G/3ML
INJECTION, POWDER, FOR SOLUTION INTRAMUSCULAR; INTRAVENOUS
Status: DISCONTINUED | OUTPATIENT
Start: 2023-10-04 | End: 2023-10-04

## 2023-10-04 RX ORDER — ONDANSETRON 2 MG/ML
INJECTION INTRAMUSCULAR; INTRAVENOUS
Status: DISCONTINUED | OUTPATIENT
Start: 2023-10-04 | End: 2023-10-04

## 2023-10-04 RX ORDER — PROPOFOL 10 MG/ML
VIAL (ML) INTRAVENOUS
Status: DISCONTINUED | OUTPATIENT
Start: 2023-10-04 | End: 2023-10-04

## 2023-10-04 RX ADMIN — PHENYLEPHRINE HYDROCHLORIDE 100 MCG: 10 INJECTION INTRAVENOUS at 10:10

## 2023-10-04 RX ADMIN — FAMOTIDINE 20 MG: 20 TABLET ORAL at 08:10

## 2023-10-04 RX ADMIN — MIDAZOLAM HYDROCHLORIDE 2 MG: 1 INJECTION, SOLUTION INTRAMUSCULAR; INTRAVENOUS at 09:10

## 2023-10-04 RX ADMIN — KETOROLAC TROMETHAMINE 30 MG: 30 INJECTION, SOLUTION INTRAMUSCULAR; INTRAVENOUS at 10:10

## 2023-10-04 RX ADMIN — SODIUM CHLORIDE, POTASSIUM CHLORIDE, SODIUM LACTATE AND CALCIUM CHLORIDE: 600; 310; 30; 20 INJECTION, SOLUTION INTRAVENOUS at 09:10

## 2023-10-04 RX ADMIN — ONDANSETRON 4 MG: 2 INJECTION INTRAMUSCULAR; INTRAVENOUS at 09:10

## 2023-10-04 RX ADMIN — FENTANYL CITRATE 50 MCG: 50 INJECTION, SOLUTION INTRAMUSCULAR; INTRAVENOUS at 09:10

## 2023-10-04 RX ADMIN — FENTANYL CITRATE 25 MCG: 50 INJECTION, SOLUTION INTRAMUSCULAR; INTRAVENOUS at 09:10

## 2023-10-04 RX ADMIN — HYDROCODONE BITARTRATE AND ACETAMINOPHEN 1 TABLET: 5; 325 TABLET ORAL at 12:10

## 2023-10-04 RX ADMIN — PROPOFOL 150 MG: 10 INJECTION, EMULSION INTRAVENOUS at 09:10

## 2023-10-04 RX ADMIN — LIDOCAINE HYDROCHLORIDE 100 MG: 20 INJECTION, SOLUTION EPIDURAL; INFILTRATION; INTRACAUDAL; PERINEURAL at 09:10

## 2023-10-04 RX ADMIN — ACETAMINOPHEN 1000 MG: 500 TABLET ORAL at 08:10

## 2023-10-04 RX ADMIN — PHENYLEPHRINE HYDROCHLORIDE 100 MCG: 10 INJECTION INTRAVENOUS at 09:10

## 2023-10-04 RX ADMIN — SUCCINYLCHOLINE CHLORIDE 200 MG: 20 INJECTION, SOLUTION INTRAMUSCULAR; INTRAVENOUS; PARENTERAL at 09:10

## 2023-10-04 RX ADMIN — 0.12% CHLORHEXIDINE GLUCONATE 10 ML: 1.2 RINSE ORAL at 08:10

## 2023-10-04 RX ADMIN — SODIUM CHLORIDE, POTASSIUM CHLORIDE, SODIUM LACTATE AND CALCIUM CHLORIDE: 600; 310; 30; 20 INJECTION, SOLUTION INTRAVENOUS at 10:10

## 2023-10-04 RX ADMIN — CEFAZOLIN 2 G: 330 INJECTION, POWDER, FOR SOLUTION INTRAMUSCULAR; INTRAVENOUS at 09:10

## 2023-10-04 NOTE — HPI
34 y/o g0 with menometrorrhagia to symptomatic anemia.  Bleeding continues despite provera.  Sono--4 cm submucous fibroid  Hgb 8.4  Upt negative

## 2023-10-04 NOTE — BRIEF OP NOTE
[facility] - Surgery (Hospital)  Brief Operative Note    Surgery Date: 10/4/2023     Surgeon(s) and Role:     * Dacia Fischer MD - Primary    Assisting Surgeon: None    Pre-op Diagnosis:  Iron deficiency anemia due to chronic blood loss [D50.0]  Fibroids, submucosal [D25.0]  Menorrhagia with regular cycle [N92.0]    Post-op Diagnosis:  Post-Op Diagnosis Codes:     * Iron deficiency anemia due to chronic blood loss [D50.0]     * Fibroids, submucosal [D25.0]     * Menorrhagia with regular cycle [N92.0]    Procedure(s) (LRB):  HYSTEROSCOPY (N/A)  POLYPECTOMY, UTERUS, HYSTEROSCOPIC (N/A)    Anesthesia: General    Operative Findings: 12 wk size uterus; large 5 cm submucous fibroid (left side of uterine cavity)    Estimated Blood Loss: 100 mL         Specimens:   Specimen (24h ago, onward)       Start     Ordered    10/04/23 1053  Specimen to Pathology, Surgery Gynecology and Obstetrics  Once        Comments: Pre-op Diagnosis: Iron deficiency anemia due to chronic blood loss [D50.0]Fibroids, submucosal [D25.0]Menorrhagia with regular cycle [N92.0]Procedure(s):HYSTEROSCOPYPOLYPECTOMY, UTERUS, HYSTEROSCOPIC Number of specimens: 1Name of specimens: 1. Submucous fibroid (perm)     References:    Click here for ordering Quick Tip   Question Answer Comment   Procedure Type: Gynecology and Obstetrics    Specimen Class: Routine/Screening    Which provider would you like to cc? DACIA FISCHER    Release to patient Immediate        10/04/23 1054                      Discharge Note    OUTCOME: Patient tolerated treatment/procedure well without complication and is now ready for discharge.    DISPOSITION: Home or Self Care    FINAL DIAGNOSIS:  Status post hysteroscopy with myosure resection of fibroid    FOLLOWUP: In clinic, terence narvaez office 2 wks    DISCHARGE INSTRUCTIONS:    Discharge Procedure Orders   Diet general     Pelvic Rest   Order Comments: X 2 wks --no tampons , intercourse, douching     No dressing needed     Call  MD for:  temperature >100.4     Call MD for:  persistent nausea and vomiting     Call MD for:  severe uncontrolled pain     Call MD for:  difficulty breathing, headache or visual disturbances

## 2023-10-04 NOTE — SUBJECTIVE & OBJECTIVE
OB History    Para Term  AB Living   0 0 0 0 0 0   SAB IAB Ectopic Multiple Live Births   0 0 0 0 0     History reviewed. No pertinent past medical history.  History reviewed. No pertinent surgical history.    PTA Medications   Medication Sig    folic acid (FOLVITE) 1 MG tablet Take 1 tablet (1 mg total) by mouth once daily.    medroxyPROGESTERone (PROVERA) 10 MG tablet Take 1 tablet (10 mg total) by mouth 2 (two) times a day.       Review of patient's allergies indicates:  No Known Allergies     Family History       Problem Relation (Age of Onset)    Cancer Sister    No Known Problems Mother, Father          Tobacco Use    Smoking status: Never    Smokeless tobacco: Never   Substance and Sexual Activity    Alcohol use: Never    Drug use: Never    Sexual activity: Not Currently     Review of Systems   Genitourinary:  Positive for menorrhagia and menstrual problem.   All other systems reviewed and are negative.     Objective:     Vital Signs (Most Recent):  Temp: 97.7 °F (36.5 °C) (10/04/23 0827)  Pulse: 75 (10/04/23 0827)  Resp: 18 (10/04/23 0827)  BP: 134/69 (10/04/23 0827)  SpO2: 100 % (10/04/23 0827) Vital Signs (24h Range):  Temp:  [97.7 °F (36.5 °C)] 97.7 °F (36.5 °C)  Pulse:  [75] 75  Resp:  [18] 18  SpO2:  [100 %] 100 %  BP: (134)/(69) 134/69     Weight: 101.4 kg (223 lb 7 oz)  Body mass index is 39.58 kg/m².    Patient's last menstrual period was 10/02/2023.     Physical Exam:   Constitutional: She is oriented to person, place, and time. She appears well-developed.     Eyes: Pupils are equal, round, and reactive to light. Conjunctivae and EOM are normal.      Pulmonary/Chest: Effort normal. Right breast exhibits no mass, no nipple discharge, no skin change, no tenderness and presence. Left breast exhibits no mass, no nipple discharge, no skin change, no tenderness and presence. Breasts are symmetrical.        Abdominal: Soft.     Genitourinary:    Pelvic exam was performed with patient  supine.             Musculoskeletal: Normal range of motion.       Neurological: She is alert and oriented to person, place, and time.    Skin: Skin is warm.    Psychiatric: She has a normal mood and affect.        Laboratory:  Recent Lab Results         10/04/23  0925        Preg Test, Ur Negative        Acceptable Yes             I have personallly reviewed all pertinent lab results from the last 24 hours.    Diagnostic Results:  Labs: Reviewed  US: Reviewed

## 2023-10-04 NOTE — ANESTHESIA PROCEDURE NOTES
Intubation    Date/Time: 10/4/2023 9:51 AM    Performed by: Sallie Norris CRNA  Authorized by: Tavares aHrrison MD    Intubation:     Induction:  Intravenous    Mask Ventilation:  Easy mask    Attempts:  1    Attempted By:  CRNA    Difficult Airway Encountered?: No      Complications:  None    Airway Device:  Supraglottic airway/LMA    Airway Device Size:  4.0    Placement Verified By:  Capnometry    Complicating Factors:  None    Findings Post-Intubation:  BS equal bilateral and atraumatic/condition of teeth unchanged

## 2023-10-04 NOTE — ANESTHESIA PREPROCEDURE EVALUATION
10/04/2023  Crista Momin is a 33 y.o., female     Patient Active Problem List   Diagnosis    Menorrhagia with regular cycle    Iron deficiency anemia due to chronic blood loss    Fibroids, submucosal    Class 2 severe obesity due to excess calories with serious comorbidity and body mass index (BMI) of 39.0 to 39.9 in adult    Benign paroxysmal positional vertigo    Dietary folate deficiency anemia     History reviewed. No pertinent past medical history.  History reviewed. No pertinent surgical history.      Chemistry        Component Value Date/Time     08/13/2023 0640    K 4.0 08/13/2023 0640     08/13/2023 0640    CO2 21 (L) 08/13/2023 0640    BUN 4 (L) 08/13/2023 0640    CREATININE 0.7 08/13/2023 0640    GLU 90 08/13/2023 0640        Component Value Date/Time    CALCIUM 8.2 (L) 08/13/2023 0640    ALKPHOS 35 (L) 08/12/2023 1224    AST 7 (L) 08/12/2023 1224    ALT <5 (L) 08/12/2023 1224    BILITOT 0.2 08/12/2023 1224        Lab Results   Component Value Date    WBC 7.26 08/31/2023    HGB 8.4 (L) 08/31/2023    HCT 30.4 (L) 08/31/2023    MCV 86 08/31/2023     (H) 08/31/2023       Pre-op Assessment    I have reviewed the Patient Summary Reports.    I have reviewed the NPO Status.   I have reviewed the Medications.     Review of Systems  Anesthesia Hx:  No previous Anesthesia  Neg history of prior surgery. Denies Family Hx of Anesthesia complications.    Social:  Non-Smoker, No Alcohol Use    Hematology/Oncology:     Oncology Normal    -- Anemia: Hematology Comments: 8.4/30.4    Plt 758    Cardiovascular:  Cardiovascular Normal     Pulmonary:   Asthma mild No daily inhalers, only Albuterol PRN   Renal/:  Renal/ Normal     Musculoskeletal:  Musculoskeletal Normal    OB/GYN/PEDS:  Fibroids, Menorrhagia    Neurological:  Neurology Normal    Endocrine:  Endocrine Normal BMI 40  Morbid Obesity / BMI > 40      Physical Exam  General: Cooperative, Alert and Oriented    Airway:  Mallampati: III   Mouth Opening: Normal  TM Distance: Normal  Tongue: Normal  Neck ROM: Normal ROM    Dental:  Intact  Patient denies any currently loose or chipped teeth; Patient denies any removable dental appliances      Anesthesia Plan  Type of Anesthesia, risks & benefits discussed:    Anesthesia Type: Gen ETT  Intra-op Monitoring Plan: Standard ASA Monitors  Post Op Pain Control Plan: multimodal analgesia and IV/PO Opioids PRN  Induction:  IV  Airway Plan: Direct, Post-Induction  Informed Consent: Informed consent signed with the Patient and all parties understand the risks and agree with anesthesia plan.  All questions answered.   ASA Score: 2  Day of Surgery Review of History & Physical: H&P Update referred to the surgeon/provider.    Ready For Surgery From Anesthesia Perspective.     .

## 2023-10-04 NOTE — ANESTHESIA PROCEDURE NOTES
Intubation    Date/Time: 10/4/2023 9:51 AM    Performed by: Sallie Norris CRNA  Authorized by: Tavares Harrison MD    Intubation:     Induction:  Intravenous    Intubated:  Postinduction    Mask Ventilation:  Easy mask    Attempts:  1    Attempted By:  CRNA    Method of Intubation:  Video laryngoscopy    Blade:  Forrest 3    Laryngeal View Grade: Grade I - full view of cords      Difficult Airway Encountered?: No      Complications:  None    Airway Device:  Oral endotracheal tube    Airway Device Size:  7.5    Style/Cuff Inflation:  Cuffed (inflated to minimal occlusive pressure)    Tube secured:  21    Secured at:  The lips    Placement Verified By:  Capnometry    Complicating Factors:  None    Findings Post-Intubation:  BS equal bilateral and atraumatic/condition of teeth unchanged

## 2023-10-04 NOTE — ASSESSMENT & PLAN NOTE
10/04/2023  Reviewed hysteroscopy with myosure procedure in detail.  Reviewed risks including but not limited to infection, bleeding, damage to bowel/bladder, cva;htn, dvt, death.\  All questions answered to the best of my ability.  Alternatives reviewed --continued observation, mirena iud, lysteda, myomectomy, depo lupron, hysterectomy.  Pt wishes to proceed with hysteroscopy with myosure resection of fibroid.  Consents signed witnessed.  Post op course reviewed

## 2023-10-04 NOTE — H&P
[facility])  Obstetrics & Gynecology  History & Physical    Patient Name: Crista Momin  MRN: [id number]  Admission Date: 10/4/2023  Primary Care Provider: HENRY Thompson MD    Subjective:     Chief Complaint/Reason for Admission: symptomatic uterine fibroids, menorrhagia    History of Present Illness:  34 y/o g0 with menometrorrhagia to symptomatic anemia.  Bleeding continues despite provera.  Sono--4 cm submucous fibroid  Hgb 8.4  Upt negative          OB History    Para Term  AB Living   0 0 0 0 0 0   SAB IAB Ectopic Multiple Live Births   0 0 0 0 0     History reviewed. No pertinent past medical history.  History reviewed. No pertinent surgical history.    PTA Medications   Medication Sig    folic acid (FOLVITE) 1 MG tablet Take 1 tablet (1 mg total) by mouth once daily.    medroxyPROGESTERone (PROVERA) 10 MG tablet Take 1 tablet (10 mg total) by mouth 2 (two) times a day.       Review of patient's allergies indicates:  No Known Allergies     Family History       Problem Relation (Age of Onset)    Cancer Sister    No Known Problems Mother, Father          Tobacco Use    Smoking status: Never    Smokeless tobacco: Never   Substance and Sexual Activity    Alcohol use: Never    Drug use: Never    Sexual activity: Not Currently     Review of Systems   Genitourinary:  Positive for menorrhagia and menstrual problem.   All other systems reviewed and are negative.     Objective:     Vital Signs (Most Recent):  Temp: 97.7 °F (36.5 °C) (10/04/23 0827)  Pulse: 75 (10/04/23 0827)  Resp: 18 (10/04/23 0827)  BP: 134/69 (10/04/23 0827)  SpO2: 100 % (10/04/23 0827) Vital Signs (24h Range):  Temp:  [97.7 °F (36.5 °C)] 97.7 °F (36.5 °C)  Pulse:  [75] 75  Resp:  [18] 18  SpO2:  [100 %] 100 %  BP: (134)/(69) 134/69     Weight: 101.4 kg (223 lb 7 oz)  Body mass index is 39.58 kg/m².    Patient's last menstrual period was 10/02/2023.     Physical Exam:   Constitutional: She is oriented  to person, place, and time. She appears well-developed.     Eyes: Pupils are equal, round, and reactive to light. Conjunctivae and EOM are normal.      Pulmonary/Chest: Effort normal. Right breast exhibits no mass, no nipple discharge, no skin change, no tenderness and presence. Left breast exhibits no mass, no nipple discharge, no skin change, no tenderness and presence. Breasts are symmetrical.        Abdominal: Soft.     Genitourinary:    Pelvic exam was performed with patient supine.             Musculoskeletal: Normal range of motion.       Neurological: She is alert and oriented to person, place, and time.    Skin: Skin is warm.    Psychiatric: She has a normal mood and affect.        Laboratory:  Recent Lab Results         10/04/23  0925        Preg Test, Ur Negative        Acceptable Yes             I have personallly reviewed all pertinent lab results from the last 24 hours.    Diagnostic Results:  Labs: Reviewed  US: Reviewed    Assessment/Plan:     Renal/  * Fibroids, submucosal  10/04/2023  Reviewed hysteroscopy with myosure procedure in detail.  Reviewed risks including but not limited to infection, bleeding, damage to bowel/bladder, cva;htn, dvt, death.\  All questions answered to the best of my ability.  Alternatives reviewed --continued observation, mirena iud, lysteda, myomectomy, depo lupron, hysterectomy.  Pt wishes to proceed with hysteroscopy with myosure resection of fibroid.  Consents signed witnessed.  Post op course reviewed            Alba Tobin MD  Obstetrics & Gynecology  [facility] - Surgery ([facility]

## 2023-10-04 NOTE — OP NOTE
[facility] - Surgery ([facility])  General Surgery  Operative Note    SUMMARY     Date of Procedure: 10/4/2023     Procedure: Procedure(s) (LRB):  HYSTEROSCOPY (N/A)  POLYPECTOMY, UTERUS, HYSTEROSCOPIC (N/A)       Surgeon(s) and Role:     * Alba Tobin MD - Primary    Assisting Surgeon: None    Pre-Operative Diagnosis: Iron deficiency anemia due to chronic blood loss [D50.0]  Fibroids, submucosal [D25.0]  Menorrhagia with regular cycle [N92.0]    Post-Operative Diagnosis: Post-Op Diagnosis Codes:     * Iron deficiency anemia due to chronic blood loss [D50.0]     * Fibroids, submucosal [D25.0]     * Menorrhagia with regular cycle [N92.0]    Anesthesia: General    Operative Findings (including complications, if any): large submucous fibroid 4-5 cm (left side of uterus); lush endometrium    Description of Technical Procedures: hysteroscopy with myosure    The patient was taken to the Operating Room where general       endotracheal anesthesia was induced and found to be adequate.  Her perineum was then prepped and draped in normal sterile fashion, and bladder was drained in an in and out fashion with the red rubber catheter.   Time out was performed.                                                    A side open speculum was  then placed in the patient's vagina, and the anterior lip of the cervix  was grasped with single-tooth tenaculum.  The uterine cervix was then gently  dilated up to 8 mm using a Hegar dilator.  The uterus was sounded to 12cm.  A 0 degree hysteroscope was advanced through the cervix to the uterine fundus using normal saline as the distension medium.  The findings stated above were noted.  The myosure xl device was inserted through the hysteroscope, and under direct visualization the fibroid was resected.  Visualization and maintaining adequate pressure was difficult.  It was felt that the fibroid was only partially resected due to the fluid deficit.    Vasopressin was injected into the uterine cavity  and cervix.      The patient had minimal  bleeding at the end of the case. The single-tooth tenaculum was removed from the anterior lip of the cervix and excellent hemostasis was obtained with cautery.  The speculum was then removed.   Sponge, lap and needle counts were correct  X 2.  She will go to Recovery in stable condition.    Fluid deficit at the end of case was noted to be 1000cc.   Significant Surgical Tasks Conducted by the Assistant(s), if Applicable: n/a    Estimated Blood Loss (EBL): 100 mL           Implants: * No implants in log *    Specimens:   Specimen (24h ago, onward)       Start     Ordered    10/04/23 1053  Specimen to Pathology, Surgery Gynecology and Obstetrics  Once        Comments: Pre-op Diagnosis: Iron deficiency anemia due to chronic blood loss [D50.0]Fibroids, submucosal [D25.0]Menorrhagia with regular cycle [N92.0]Procedure(s):HYSTEROSCOPYPOLYPECTOMY, UTERUS, HYSTEROSCOPIC Number of specimens: 1Name of specimens: 1. Submucous fibroid (perm)     References:    Click here for ordering Quick Tip   Question Answer Comment   Procedure Type: Gynecology and Obstetrics    Specimen Class: Routine/Screening    Which provider would you like to cc? DACIA FISCHER    Release to patient Immediate        10/04/23 1057                            Condition: Good    Disposition: PACU - hemodynamically stable.    Attestation: I performed the procedure.

## 2023-10-04 NOTE — TRANSFER OF CARE
"Anesthesia Transfer of Care Note    Patient: Crista Momin    Procedure(s) Performed: Procedure(s) (LRB):  HYSTEROSCOPY (N/A)  POLYPECTOMY, UTERUS, HYSTEROSCOPIC (N/A)    Patient location: PACU    Anesthesia Type: general    Transport from OR: Transported from OR on room air with adequate spontaneous ventilation    Post pain: adequate analgesia    Post assessment: no apparent anesthetic complications    Post vital signs: stable    Level of consciousness: sedated    Nausea/Vomiting: no nausea/vomiting    Complications: none    Transfer of care protocol was followed      Last vitals:   Visit Vitals  /69 (BP Location: Right arm, Patient Position: Sitting)   Pulse 75   Temp 36.5 °C (97.7 °F) (Temporal)   Resp 18   Ht 5' 3" (1.6 m)   Wt 101.4 kg (223 lb 7 oz)   LMP 10/02/2023   SpO2 100%   Breastfeeding No   BMI 39.58 kg/m²     "

## 2023-10-05 NOTE — ANESTHESIA POSTPROCEDURE EVALUATION
Anesthesia Post Evaluation    Patient: Crista Momin    Procedure(s) Performed: Procedure(s) (LRB):  HYSTEROSCOPY (N/A)  POLYPECTOMY, UTERUS, HYSTEROSCOPIC (N/A)    Final Anesthesia Type: general      Patient location during evaluation: PACU  Patient participation: Yes- Able to Participate  Level of consciousness: awake and alert and oriented  Post-procedure vital signs: reviewed and stable  Pain management: adequate  Airway patency: patent  SHANNAN mitigation strategies: Verification of full reversal of neuromuscular block  PONV status at discharge: No PONV  Anesthetic complications: no      Cardiovascular status: blood pressure returned to baseline and hemodynamically stable  Respiratory status: unassisted  Hydration status: euvolemic  Follow-up not needed.          Vitals Value Taken Time   /73 10/04/23 1201   Temp 36.1 °C (97 °F) 10/04/23 1115   Pulse 78 10/04/23 1215   Resp 12 10/04/23 1215   SpO2 96 % 10/04/23 1215   Vitals shown include unvalidated device data.      Event Time   Out of Recovery 12:18:30         Pain/Vickie Score: Pain Rating Prior to Med Admin: 4 (10/4/2023 12:09 PM)  Ivckie Score: 9 (10/4/2023 12:00 PM)

## 2023-10-10 ENCOUNTER — PATIENT MESSAGE (OUTPATIENT)
Dept: OBSTETRICS AND GYNECOLOGY | Facility: CLINIC | Age: 33
End: 2023-10-10
Payer: COMMERCIAL

## 2023-10-10 VITALS
SYSTOLIC BLOOD PRESSURE: 138 MMHG | WEIGHT: 223.44 LBS | RESPIRATION RATE: 15 BRPM | TEMPERATURE: 97 F | HEART RATE: 82 BPM | HEIGHT: 63 IN | DIASTOLIC BLOOD PRESSURE: 65 MMHG | OXYGEN SATURATION: 99 % | BODY MASS INDEX: 39.59 KG/M2

## 2023-10-10 LAB
FINAL PATHOLOGIC DIAGNOSIS: NORMAL
GROSS: NORMAL
Lab: NORMAL

## 2023-10-19 DIAGNOSIS — D50.0 IRON DEFICIENCY ANEMIA DUE TO CHRONIC BLOOD LOSS: Chronic | ICD-10-CM

## 2023-10-19 DIAGNOSIS — N92.0 MENORRHAGIA WITH REGULAR CYCLE: Chronic | ICD-10-CM

## 2023-10-19 DIAGNOSIS — D25.0 FIBROIDS, SUBMUCOSAL: Chronic | ICD-10-CM

## 2023-10-19 NOTE — TELEPHONE ENCOUNTER
Refill Routing Note   Medication(s) are not appropriate for processing by [facility] for the following reason(s):      New or recently adjusted medication    [facility] action(s):  Defer Care Due:  None identified          Appointments  past 12m or future 3m with PCP    Date Provider   Last Visit   8/31/2023 Alba Tobin MD   Next Visit   10/24/2023 Alba Tobin MD   ED visits in past 90 days: 0        Note composed:11:28 AM 10/19/2023

## 2023-10-23 RX ORDER — MEDROXYPROGESTERONE ACETATE 10 MG/1
10 TABLET ORAL 2 TIMES DAILY
Qty: 180 TABLET | Refills: 3 | Status: ON HOLD | OUTPATIENT
Start: 2023-10-23 | End: 2024-01-26 | Stop reason: HOSPADM

## 2023-10-24 ENCOUNTER — OFFICE VISIT (OUTPATIENT)
Dept: OBSTETRICS AND GYNECOLOGY | Facility: CLINIC | Age: 33
End: 2023-10-24
Payer: COMMERCIAL

## 2023-10-24 VITALS
BODY MASS INDEX: 40.66 KG/M2 | SYSTOLIC BLOOD PRESSURE: 126 MMHG | WEIGHT: 229.5 LBS | HEIGHT: 63 IN | DIASTOLIC BLOOD PRESSURE: 76 MMHG

## 2023-10-24 DIAGNOSIS — D50.0 IRON DEFICIENCY ANEMIA DUE TO CHRONIC BLOOD LOSS: Chronic | ICD-10-CM

## 2023-10-24 DIAGNOSIS — N92.0 MENORRHAGIA WITH REGULAR CYCLE: Chronic | ICD-10-CM

## 2023-10-24 DIAGNOSIS — D25.0 FIBROIDS, SUBMUCOSAL: Primary | ICD-10-CM

## 2023-10-24 PROCEDURE — 99024 PR POST-OP FOLLOW-UP VISIT: ICD-10-PCS | Mod: S$GLB,,, | Performed by: OBSTETRICS & GYNECOLOGY

## 2023-10-24 PROCEDURE — 3074F SYST BP LT 130 MM HG: CPT | Mod: CPTII,S$GLB,, | Performed by: OBSTETRICS & GYNECOLOGY

## 2023-10-24 PROCEDURE — 96372 PR INJECTION,THERAP/PROPH/DIAG2ST, IM OR SUBCUT: ICD-10-PCS | Mod: S$GLB,,, | Performed by: OBSTETRICS & GYNECOLOGY

## 2023-10-24 PROCEDURE — 99999 PR PBB SHADOW E&M-EST. PATIENT-LVL III: ICD-10-PCS | Mod: PBBFAC,,, | Performed by: OBSTETRICS & GYNECOLOGY

## 2023-10-24 PROCEDURE — 3044F HG A1C LEVEL LT 7.0%: CPT | Mod: CPTII,S$GLB,, | Performed by: OBSTETRICS & GYNECOLOGY

## 2023-10-24 PROCEDURE — 99999 PR PBB SHADOW E&M-EST. PATIENT-LVL III: CPT | Mod: PBBFAC,,, | Performed by: OBSTETRICS & GYNECOLOGY

## 2023-10-24 PROCEDURE — 1159F PR MEDICATION LIST DOCUMENTED IN MEDICAL RECORD: ICD-10-PCS | Mod: CPTII,S$GLB,, | Performed by: OBSTETRICS & GYNECOLOGY

## 2023-10-24 PROCEDURE — 3074F PR MOST RECENT SYSTOLIC BLOOD PRESSURE < 130 MM HG: ICD-10-PCS | Mod: CPTII,S$GLB,, | Performed by: OBSTETRICS & GYNECOLOGY

## 2023-10-24 PROCEDURE — 3044F PR MOST RECENT HEMOGLOBIN A1C LEVEL <7.0%: ICD-10-PCS | Mod: CPTII,S$GLB,, | Performed by: OBSTETRICS & GYNECOLOGY

## 2023-10-24 PROCEDURE — 3078F PR MOST RECENT DIASTOLIC BLOOD PRESSURE < 80 MM HG: ICD-10-PCS | Mod: CPTII,S$GLB,, | Performed by: OBSTETRICS & GYNECOLOGY

## 2023-10-24 PROCEDURE — 1159F MED LIST DOCD IN RCRD: CPT | Mod: CPTII,S$GLB,, | Performed by: OBSTETRICS & GYNECOLOGY

## 2023-10-24 PROCEDURE — 3078F DIAST BP <80 MM HG: CPT | Mod: CPTII,S$GLB,, | Performed by: OBSTETRICS & GYNECOLOGY

## 2023-10-24 PROCEDURE — 96372 THER/PROPH/DIAG INJ SC/IM: CPT | Mod: S$GLB,,, | Performed by: OBSTETRICS & GYNECOLOGY

## 2023-10-24 PROCEDURE — 99024 POSTOP FOLLOW-UP VISIT: CPT | Mod: S$GLB,,, | Performed by: OBSTETRICS & GYNECOLOGY

## 2023-10-24 RX ORDER — MEDROXYPROGESTERONE ACETATE 150 MG/ML
150 INJECTION, SUSPENSION INTRAMUSCULAR
Status: DISCONTINUED | OUTPATIENT
Start: 2023-10-24 | End: 2024-01-26 | Stop reason: HOSPADM

## 2023-10-24 RX ORDER — ELAGOLIX AND ESTRADIOL AND NORETHISTERONE 300-1-0.5
1 KIT ORAL 2 TIMES DAILY
Qty: 60 CAPSULE | Refills: 6 | Status: ON HOLD | OUTPATIENT
Start: 2023-10-24 | End: 2024-01-26 | Stop reason: HOSPADM

## 2023-10-24 RX ADMIN — MEDROXYPROGESTERONE ACETATE 150 MG: 150 INJECTION, SUSPENSION INTRAMUSCULAR at 12:10

## 2023-10-24 NOTE — PROGRESS NOTES
Subjective:       Patient ID: Crista Momin is a 33 y.o. female.    Chief Complaint:  Post-op Evaluation      History of Present Illness  HPI  Postoperative Follow-up  Patient presents to the clinic 2 weeks status post operative hysteroscopy for fibroids--submucous--partial resection--large uterine fibroids. Eating a regular diet without difficulty. Bowel movements are normal. The patient is not having any pain.  Continues t take provera 10 bid, still with bleeding-- (at heaviest--overnight pad, change q 3-4 hrs)    GYN & OB History  Patient's last menstrual period was 10/02/2023.   Date of Last Pap: No result found    OB History    Para Term  AB Living   0 0 0 0 0 0   SAB IAB Ectopic Multiple Live Births   0 0 0 0 0       Review of Systems  Review of Systems   Genitourinary:  Positive for menorrhagia and menstrual problem.   All other systems reviewed and are negative.          Objective:      Physical Exam:   Constitutional: She appears well-developed.     Eyes: Pupils are equal, round, and reactive to light. Conjunctivae and EOM are normal.      Pulmonary/Chest: Effort normal.        Abdominal: Soft.             Musculoskeletal: Normal range of motion.       Neurological: She is alert.    Skin: Skin is warm.    Psychiatric: She has a normal mood and affect.           Assessment:        1. Fibroids, submucosal    2. Iron deficiency anemia due to chronic blood loss    3. Menorrhagia with regular cycle               Plan:   Post op findings reviewed  Pt made aware fibroid could not be resected completely  Recommend, depo provera, oriahnn/myfembre ; depo lupron, myomectomy, hysterectomy  Pt prefers myomectomy --but cannot do until January  Agrees to [facility] if approved but depo provera for now  Continue daily iron  Myomectomy case request submitted; avs info provided

## 2023-10-24 NOTE — PROGRESS NOTES
150mg of medroxyprogesterone administered IM to left ventrogluteal. Patient tolerated well. No pain or discomfort noted. Advised to wait 15 minutes after in clinic. Patient verbalized understanding.

## 2023-10-25 ENCOUNTER — PATIENT MESSAGE (OUTPATIENT)
Dept: OBSTETRICS AND GYNECOLOGY | Facility: CLINIC | Age: 33
End: 2023-10-25
Payer: COMMERCIAL

## 2023-10-30 DIAGNOSIS — N92.0 MENORRHAGIA WITH REGULAR CYCLE: ICD-10-CM

## 2023-10-30 DIAGNOSIS — D50.0 IRON DEFICIENCY ANEMIA DUE TO CHRONIC BLOOD LOSS: ICD-10-CM

## 2023-10-30 DIAGNOSIS — D25.0 FIBROIDS, SUBMUCOSAL: Primary | ICD-10-CM

## 2024-01-08 ENCOUNTER — OFFICE VISIT (OUTPATIENT)
Dept: OBSTETRICS AND GYNECOLOGY | Facility: CLINIC | Age: 34
End: 2024-01-08
Payer: COMMERCIAL

## 2024-01-08 ENCOUNTER — LAB VISIT (OUTPATIENT)
Dept: LAB | Facility: HOSPITAL | Age: 34
End: 2024-01-08
Attending: PHYSICIAN ASSISTANT
Payer: COMMERCIAL

## 2024-01-08 VITALS
DIASTOLIC BLOOD PRESSURE: 84 MMHG | WEIGHT: 239.44 LBS | BODY MASS INDEX: 42.43 KG/M2 | SYSTOLIC BLOOD PRESSURE: 144 MMHG | HEIGHT: 63 IN

## 2024-01-08 DIAGNOSIS — D25.0 FIBROIDS, SUBMUCOSAL: ICD-10-CM

## 2024-01-08 DIAGNOSIS — D50.0 IRON DEFICIENCY ANEMIA DUE TO CHRONIC BLOOD LOSS: ICD-10-CM

## 2024-01-08 DIAGNOSIS — D25.0 FIBROIDS, SUBMUCOSAL: Primary | ICD-10-CM

## 2024-01-08 DIAGNOSIS — Z80.41 FAMILY HISTORY OF OVARIAN CANCER: ICD-10-CM

## 2024-01-08 DIAGNOSIS — N92.0 MENORRHAGIA WITH REGULAR CYCLE: ICD-10-CM

## 2024-01-08 LAB
ANION GAP SERPL CALC-SCNC: 7 MMOL/L (ref 8–16)
BUN SERPL-MCNC: 9 MG/DL (ref 6–20)
CALCIUM SERPL-MCNC: 8.7 MG/DL (ref 8.7–10.5)
CHLORIDE SERPL-SCNC: 110 MMOL/L (ref 95–110)
CO2 SERPL-SCNC: 22 MMOL/L (ref 23–29)
CREAT SERPL-MCNC: 0.7 MG/DL (ref 0.5–1.4)
EST. GFR  (NO RACE VARIABLE): >60 ML/MIN/1.73 M^2
GLUCOSE SERPL-MCNC: 126 MG/DL (ref 70–110)
POTASSIUM SERPL-SCNC: 4 MMOL/L (ref 3.5–5.1)
SODIUM SERPL-SCNC: 139 MMOL/L (ref 136–145)

## 2024-01-08 PROCEDURE — 80048 BASIC METABOLIC PNL TOTAL CA: CPT | Performed by: PHYSICIAN ASSISTANT

## 2024-01-08 PROCEDURE — 3079F DIAST BP 80-89 MM HG: CPT | Mod: CPTII,S$GLB,, | Performed by: OBSTETRICS & GYNECOLOGY

## 2024-01-08 PROCEDURE — 3077F SYST BP >= 140 MM HG: CPT | Mod: CPTII,S$GLB,, | Performed by: OBSTETRICS & GYNECOLOGY

## 2024-01-08 PROCEDURE — 3008F BODY MASS INDEX DOCD: CPT | Mod: CPTII,S$GLB,, | Performed by: OBSTETRICS & GYNECOLOGY

## 2024-01-08 PROCEDURE — 99999 PR PBB SHADOW E&M-EST. PATIENT-LVL III: CPT | Mod: PBBFAC,,, | Performed by: OBSTETRICS & GYNECOLOGY

## 2024-01-08 PROCEDURE — 36415 COLL VENOUS BLD VENIPUNCTURE: CPT | Mod: PN | Performed by: PHYSICIAN ASSISTANT

## 2024-01-08 PROCEDURE — 1159F MED LIST DOCD IN RCRD: CPT | Mod: CPTII,S$GLB,, | Performed by: OBSTETRICS & GYNECOLOGY

## 2024-01-08 PROCEDURE — 99213 OFFICE O/P EST LOW 20 MIN: CPT | Mod: S$GLB,,, | Performed by: OBSTETRICS & GYNECOLOGY

## 2024-01-08 PROCEDURE — 85025 COMPLETE CBC W/AUTO DIFF WBC: CPT | Performed by: PHYSICIAN ASSISTANT

## 2024-01-08 NOTE — H&P (VIEW-ONLY)
History & Physical    SUBJECTIVE:     History of Present Illness:  Patient is a 33 y.o. female presents with menometrorhagia.  Known fibroid uterus; . Unable to fully resect fibroid during myosure due to size.  Patient reports continued daily bleeding. .Wishes to preserve uterus.    Chief Complaint   Patient presents with    Pre-op Exam       Review of patient's allergies indicates:  No Known Allergies    Current Outpatient Medications   Medication Sig Dispense Refill    elagolix-estradiol-norethindrn (ORIAHNN) 300-1-0.5mg(AM) /300 mg(PM) CpSQ Take 1 capsule by mouth 2 (two) times daily. 60 capsule 6    medroxyPROGESTERone (PROVERA) 10 MG tablet TAKE 1 TABLET(10 MG) BY MOUTH TWICE DAILY 180 tablet 3    folic acid (FOLVITE) 1 MG tablet Take 1 tablet (1 mg total) by mouth once daily. 30 tablet 0     Current Facility-Administered Medications   Medication Dose Route Frequency Provider Last Rate Last Admin    medroxyPROGESTERone (DEPO-PROVERA) syringe 150 mg  150 mg Intramuscular Q90 Days Alba Tobin MD   150 mg at 10/24/23 1204       Past Medical History:   Diagnosis Date    Mild intermittent asthma, uncomplicated      Past Surgical History:   Procedure Laterality Date    HYSTEROSCOPIC POLYPECTOMY OF UTERUS N/A 10/4/2023    Procedure: POLYPECTOMY, UTERUS, HYSTEROSCOPIC;  Surgeon: Alba Tobin MD;  Location: [facility] OR;  Service: OB/GYN;  Laterality: N/A;    HYSTEROSCOPY N/A 10/4/2023    Procedure: HYSTEROSCOPY;  Surgeon: Alba Tobin MD;  Location: [facility] OR;  Service: OB/GYN;  Laterality: N/A;  D&C  Myosure     Family History   Problem Relation Age of Onset    No Known Problems Mother     No Known Problems Father     Cancer Sister      Social History     Tobacco Use    Smoking status: Never    Smokeless tobacco: Never   Substance Use Topics    Alcohol use: Never    Drug use: Never        Review of Systems:  Review of Systems   Genitourinary:  Positive for menstrual problem.   All other systems reviewed and are  "negative.      OBJECTIVE:     Vital Signs (Most Recent)  BP: (!) 144/84 (01/08/24 1006)  5' 3" (1.6 m)  108.6 kg (239 lb 6.7 oz)     Physical Exam:  Physical Exam  Vitals reviewed.   Constitutional:       Appearance: Normal appearance. She is well-developed.   HENT:      Head: Normocephalic and atraumatic.   Eyes:      General: Lids are normal.      Conjunctiva/sclera: Conjunctivae normal.      Pupils: Pupils are equal, round, and reactive to light.   Neck:      Trachea: Trachea normal.   Cardiovascular:      Rate and Rhythm: Normal rate and regular rhythm.      Pulses: Normal pulses.      Heart sounds: Normal heart sounds.   Pulmonary:      Effort: Pulmonary effort is normal.      Breath sounds: Normal breath sounds.   Abdominal:      General: Bowel sounds are normal.      Palpations: Abdomen is soft.   Genitourinary:     Exam position: Supine.      Labia:         Right: No rash, tenderness or lesion.         Left: No rash, tenderness or lesion.       Vagina: Normal. No vaginal discharge, erythema or tenderness.      Cervix: No cervical motion tenderness, discharge or friability.      Adnexa:         Right: No mass, tenderness or fullness.          Left: No mass, tenderness or fullness.        Rectum: Normal. No mass or external hemorrhoid.   Musculoskeletal:         General: Normal range of motion.      Cervical back: Full passive range of motion without pain, normal range of motion and neck supple.   Skin:     General: Skin is warm and dry.   Neurological:      Mental Status: She is alert and oriented to person, place, and time.   Psychiatric:         Behavior: Behavior normal.         Thought Content: Thought content normal.         Judgment: Judgment normal.         Laboratory  Cbc, cmp to be drawn preop    Diagnostic Results:  US: Reviewed  Uterine length up to 10 cm.  Uterine leiomyomata suspected largest measurable lesion 4 cm.  Endometrial stripe not discriminated possibly distorted by fibroid submucosal.  " Ovaries unremarkable with positive Doppler flow.  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    Family history of ovarian cancer     Fibroids, submucosal Yes    Iron deficiency anemia due to chronic blood loss          PLAN:Plan   Reviewed abdominal myomectomy procedure in detail.    Reviewed risks including but not limited to infection, bleeding, scar tissue formation  damage to fallopian tubes/bowel/bladder/ureter, cva;htn, dvt, death.  Pt aware will need c/section as mode of delivery.     Patient  Is aware that fibroids will regrow.  All questions answered to the best of my ability.  Alternatives reviewed --continue observation, mirena/nsaid, hysterectomy.  Consents signed and witnessed.  Patient aware of surgery date and time.  Post op course reviewed.  Plans 6 wks off from work;

## 2024-01-08 NOTE — PROGRESS NOTES
History & Physical    SUBJECTIVE:     History of Present Illness:  Patient is a 33 y.o. female presents with menometrorhagia.  Known fibroid uterus; . Unable to fully resect fibroid during myosure due to size.  Patient reports continued daily bleeding. .Wishes to preserve uterus.    Chief Complaint   Patient presents with    Pre-op Exam       Review of patient's allergies indicates:  No Known Allergies    Current Outpatient Medications   Medication Sig Dispense Refill    elagolix-estradiol-norethindrn (ORIAHNN) 300-1-0.5mg(AM) /300 mg(PM) CpSQ Take 1 capsule by mouth 2 (two) times daily. 60 capsule 6    medroxyPROGESTERone (PROVERA) 10 MG tablet TAKE 1 TABLET(10 MG) BY MOUTH TWICE DAILY 180 tablet 3    folic acid (FOLVITE) 1 MG tablet Take 1 tablet (1 mg total) by mouth once daily. 30 tablet 0     Current Facility-Administered Medications   Medication Dose Route Frequency Provider Last Rate Last Admin    medroxyPROGESTERone (DEPO-PROVERA) syringe 150 mg  150 mg Intramuscular Q90 Days Alba Tobin MD   150 mg at 10/24/23 1204       Past Medical History:   Diagnosis Date    Mild intermittent asthma, uncomplicated      Past Surgical History:   Procedure Laterality Date    HYSTEROSCOPIC POLYPECTOMY OF UTERUS N/A 10/4/2023    Procedure: POLYPECTOMY, UTERUS, HYSTEROSCOPIC;  Surgeon: Alba Tobin MD;  Location: [facility] OR;  Service: OB/GYN;  Laterality: N/A;    HYSTEROSCOPY N/A 10/4/2023    Procedure: HYSTEROSCOPY;  Surgeon: Alba Tobin MD;  Location: [facility] OR;  Service: OB/GYN;  Laterality: N/A;  D&C  Myosure     Family History   Problem Relation Age of Onset    No Known Problems Mother     No Known Problems Father     Cancer Sister      Social History     Tobacco Use    Smoking status: Never    Smokeless tobacco: Never   Substance Use Topics    Alcohol use: Never    Drug use: Never        Review of Systems:  Review of Systems   Genitourinary:  Positive for menstrual problem.   All other systems reviewed and are  "negative.      OBJECTIVE:     Vital Signs (Most Recent)  BP: (!) 144/84 (01/08/24 1006)  5' 3" (1.6 m)  108.6 kg (239 lb 6.7 oz)     Physical Exam:  Physical Exam  Vitals reviewed.   Constitutional:       Appearance: Normal appearance. She is well-developed.   HENT:      Head: Normocephalic and atraumatic.   Eyes:      General: Lids are normal.      Conjunctiva/sclera: Conjunctivae normal.      Pupils: Pupils are equal, round, and reactive to light.   Neck:      Trachea: Trachea normal.   Cardiovascular:      Rate and Rhythm: Normal rate and regular rhythm.      Pulses: Normal pulses.      Heart sounds: Normal heart sounds.   Pulmonary:      Effort: Pulmonary effort is normal.      Breath sounds: Normal breath sounds.   Abdominal:      General: Bowel sounds are normal.      Palpations: Abdomen is soft.   Genitourinary:     Exam position: Supine.      Labia:         Right: No rash, tenderness or lesion.         Left: No rash, tenderness or lesion.       Vagina: Normal. No vaginal discharge, erythema or tenderness.      Cervix: No cervical motion tenderness, discharge or friability.      Adnexa:         Right: No mass, tenderness or fullness.          Left: No mass, tenderness or fullness.        Rectum: Normal. No mass or external hemorrhoid.   Musculoskeletal:         General: Normal range of motion.      Cervical back: Full passive range of motion without pain, normal range of motion and neck supple.   Skin:     General: Skin is warm and dry.   Neurological:      Mental Status: She is alert and oriented to person, place, and time.   Psychiatric:         Behavior: Behavior normal.         Thought Content: Thought content normal.         Judgment: Judgment normal.         Laboratory  Cbc, cmp to be drawn preop    Diagnostic Results:  US: Reviewed  Uterine length up to 10 cm.  Uterine leiomyomata suspected largest measurable lesion 4 cm.  Endometrial stripe not discriminated possibly distorted by fibroid submucosal.  " Ovaries unremarkable with positive Doppler flow.  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    Family history of ovarian cancer     Fibroids, submucosal Yes    Iron deficiency anemia due to chronic blood loss          PLAN:Plan   Reviewed abdominal myomectomy procedure in detail.    Reviewed risks including but not limited to infection, bleeding, scar tissue formation  damage to fallopian tubes/bowel/bladder/ureter, cva;htn, dvt, death.  Pt aware will need c/section as mode of delivery.     Patient  Is aware that fibroids will regrow.  All questions answered to the best of my ability.  Alternatives reviewed --continue observation, mirena/nsaid, hysterectomy.  Consents signed and witnessed.  Patient aware of surgery date and time.  Post op course reviewed.  Plans 6 wks off from work;

## 2024-01-09 LAB
BASOPHILS # BLD AUTO: 0.04 K/UL (ref 0–0.2)
BASOPHILS NFR BLD: 0.7 % (ref 0–1.9)
DIFFERENTIAL METHOD BLD: ABNORMAL
EOSINOPHIL # BLD AUTO: 0.2 K/UL (ref 0–0.5)
EOSINOPHIL NFR BLD: 3.3 % (ref 0–8)
ERYTHROCYTE [DISTWIDTH] IN BLOOD BY AUTOMATED COUNT: 19.6 % (ref 11.5–14.5)
HCT VFR BLD AUTO: 28.3 % (ref 37–48.5)
HGB BLD-MCNC: 7.4 G/DL (ref 12–16)
IMM GRANULOCYTES # BLD AUTO: 0.01 K/UL (ref 0–0.04)
IMM GRANULOCYTES NFR BLD AUTO: 0.2 % (ref 0–0.5)
LYMPHOCYTES # BLD AUTO: 1.5 K/UL (ref 1–4.8)
LYMPHOCYTES NFR BLD: 25.7 % (ref 18–48)
MCH RBC QN AUTO: 18.7 PG (ref 27–31)
MCHC RBC AUTO-ENTMCNC: 26.1 G/DL (ref 32–36)
MCV RBC AUTO: 72 FL (ref 82–98)
MONOCYTES # BLD AUTO: 0.5 K/UL (ref 0.3–1)
MONOCYTES NFR BLD: 9 % (ref 4–15)
NEUTROPHILS # BLD AUTO: 3.5 K/UL (ref 1.8–7.7)
NEUTROPHILS NFR BLD: 61.1 % (ref 38–73)
NRBC BLD-RTO: 0 /100 WBC
PLATELET # BLD AUTO: 405 K/UL (ref 150–450)
PMV BLD AUTO: 11.1 FL (ref 9.2–12.9)
RBC # BLD AUTO: 3.95 M/UL (ref 4–5.4)
WBC # BLD AUTO: 5.79 K/UL (ref 3.9–12.7)

## 2024-01-17 NOTE — PRE-PROCEDURE INSTRUCTIONS
Pre op instructions reviewed with patient per phone on 1/17/24: Spoke about pre op process and surgery instructions, verbalized understanding.    Surgery is scheduled on 1/24/24.  We will call you the business day prior to surgery to confirm arrival time (after 2:30 pm), as it is subject to change due to cancellations & emergencies.    Please report to the [facility] (1st Floor) at [facility] located off of [facility] access [facility] (2nd building on the left, in front of the [facility]),address: [address] [place] [address] [place] [address]    INSTRUCTIONS IMPORTANT!!!  Do Not Eat, Drink, or Smoke after 12 midnight! NO WATER after midnight! OK to brush teeth, no gum, candy or mints!    Take only these medicines with a small swallow of water-morning of surgery:  Provera    ____  NO Acrylic/fake nails or nail polish worn day of surgery (specifically hand/arm & foot surgeries).  ____  NO powder, lotions, deodorants, oils or creams on body.  ____  Please Remove All jewelry & piercings prior to surgery.  ____  Please Remove Dentures, Hearing Aids & Contact Lens prior to the start of surgery.  ____  Please bring photo ID and insurance information to hospital (Leave Valuables at Home).  ____  If going home the same day, arrange for a ride home. You will not be able to drive 24 hrs if Anesthesia was used.   ____  Females (ages 11-60) may need to give a urine sample the morning of surgery; please see Pre op Nurse prior to voiding.  ____  Wear clean, loose fitting clothing. Allow for dressings, bandages.  ____  Stop all Aspirin products, Ibuprofen, Advil, Motrin & Aleve at least 5-7 days before surgery, unless otherwise instructed by your doctor, or the nurse.   ____  Blood Thinners are stopped based on your Provider's recommendation; Call Surgeon's Office to inquire when to stop/hold.  ____  Stop taking any Fish Oil supplements or Vitamins at least 5 days prior to surgery, unless instructed otherwise by your Doctor.            Bathing  Instructions:    -Do not shave your face or body the day before or the day of surgery.              -Shower & Rinse your body as usual with anti-bacterial Soap (Dial), on the evening prior to surgery and the morning of surgery.               -Rinse your body thoroughly.                -Pat yourself day with a clean, soft towel.               -Do not use lotion, cream, powder or deodorant               -Dress in clean clothes     [facility] Visitor/Ride Policy:  Only 2 adults allowed (over the age of 18) to accompany you to the Hospital. You Must have a ride home from a responsible adult that you know and trust. Medical Transport, Uber or Lyft can only be used if patient has a responsible adult to accompany them during ride home.    Post-Op Instructions: You will receive Post-op/Discharge instructions by your Discharge Nurse prior to going home. Please call your Surgeon's office with any post-surgery questions/concerns.    *Call [facility] Department with surgery instruction questions @ [phone]   or [phone]  (Mon-Fri 8 am to 4 pm)    *If you are running late or have questions the morning of surgery, please call the Surgery Dept @ [phone]  *Insurance/ Financial Questions, please call [phone].

## 2024-01-23 ENCOUNTER — TELEPHONE (OUTPATIENT)
Dept: OBSTETRICS AND GYNECOLOGY | Facility: CLINIC | Age: 34
End: 2024-01-23
Payer: COMMERCIAL

## 2024-01-23 ENCOUNTER — TELEPHONE (OUTPATIENT)
Dept: PREADMISSION TESTING | Facility: HOSPITAL | Age: 34
End: 2024-01-23
Payer: COMMERCIAL

## 2024-01-23 NOTE — TELEPHONE ENCOUNTER
----- Message from Hailey Maguire RN sent at 1/23/2024  2:24 PM CST -----  Regarding: SURGERY 1/24  Good Afternoon,    Unable to reach pt to review arrival time for surgery tomorrow.  Arrival time-7:45 am  Ave-1/24/24    Thank you,    JOSE

## 2024-01-23 NOTE — TELEPHONE ENCOUNTER
Called and LVM about the following:     Please arrive to [facility] ([facility] at 7:45 AM on 1/24/24 for your scheduled procedure.  Address: [address] [place] [address] [place] [address] (2nd Building on left, 1st Floor Lobby)  >>>NO eating or drinking after midnight unless instructed otherwise by your Surgeon<<<

## 2024-01-24 ENCOUNTER — HOSPITAL ENCOUNTER (INPATIENT)
Facility: HOSPITAL | Age: 34
LOS: 1 days | Discharge: HOME | DRG: 742 | End: 2024-01-26
Attending: OBSTETRICS & GYNECOLOGY | Admitting: OBSTETRICS & GYNECOLOGY
Payer: COMMERCIAL

## 2024-01-24 ENCOUNTER — ANESTHESIA (OUTPATIENT)
Dept: SURGERY | Facility: HOSPITAL | Age: 34
DRG: 742 | End: 2024-01-24
Payer: COMMERCIAL

## 2024-01-24 ENCOUNTER — ANESTHESIA EVENT (OUTPATIENT)
Dept: SURGERY | Facility: HOSPITAL | Age: 34
DRG: 742 | End: 2024-01-24
Payer: COMMERCIAL

## 2024-01-24 DIAGNOSIS — D21.9 FIBROIDS: ICD-10-CM

## 2024-01-24 DIAGNOSIS — D25.0 FIBROIDS, SUBMUCOSAL: Chronic | ICD-10-CM

## 2024-01-24 DIAGNOSIS — Z98.890 STATUS POST HYSTEROSCOPY: Primary | ICD-10-CM

## 2024-01-24 LAB
ABO + RH BLD: ABNORMAL
B-HCG UR QL: NEGATIVE
BLD GP AB SCN CELLS X3 SERPL QL: ABNORMAL
BLOOD GROUP ANTIBODIES SERPL: NORMAL
CTP QC/QA: YES
PATHOLOGIST INTERPRETATION AB/XM: NORMAL
SPECIMEN OUTDATE: ABNORMAL

## 2024-01-24 PROCEDURE — 37000008 HC ANESTHESIA 1ST 15 MINUTES: Performed by: OBSTETRICS & GYNECOLOGY

## 2024-01-24 PROCEDURE — 37000009 HC ANESTHESIA EA ADD 15 MINS: Performed by: OBSTETRICS & GYNECOLOGY

## 2024-01-24 PROCEDURE — 86077 PHYS BLOOD BANK SERV XMATCH: CPT | Mod: ,,, | Performed by: STUDENT IN AN ORGANIZED HEALTH CARE EDUCATION/TRAINING PROGRAM

## 2024-01-24 PROCEDURE — C1765 ADHESION BARRIER: HCPCS | Performed by: OBSTETRICS & GYNECOLOGY

## 2024-01-24 PROCEDURE — 86850 RBC ANTIBODY SCREEN: CPT | Performed by: OBSTETRICS & GYNECOLOGY

## 2024-01-24 PROCEDURE — 36000707: Performed by: OBSTETRICS & GYNECOLOGY

## 2024-01-24 PROCEDURE — 36000706: Performed by: OBSTETRICS & GYNECOLOGY

## 2024-01-24 PROCEDURE — 27201423 OPTIME MED/SURG SUP & DEVICES STERILE SUPPLY: Performed by: OBSTETRICS & GYNECOLOGY

## 2024-01-24 PROCEDURE — 71000039 HC RECOVERY, EACH ADD'L HOUR: Performed by: OBSTETRICS & GYNECOLOGY

## 2024-01-24 PROCEDURE — 25000003 PHARM REV CODE 250: Performed by: OBSTETRICS & GYNECOLOGY

## 2024-01-24 PROCEDURE — 63600175 PHARM REV CODE 636 W HCPCS: Performed by: NURSE ANESTHETIST, CERTIFIED REGISTERED

## 2024-01-24 PROCEDURE — 63600175 PHARM REV CODE 636 W HCPCS: Mod: JZ,JG | Performed by: OBSTETRICS & GYNECOLOGY

## 2024-01-24 PROCEDURE — 0UB90ZZ EXCISION OF UTERUS, OPEN APPROACH: ICD-10-PCS | Performed by: OBSTETRICS & GYNECOLOGY

## 2024-01-24 PROCEDURE — 25000003 PHARM REV CODE 250: Performed by: ANESTHESIOLOGY

## 2024-01-24 PROCEDURE — 63600175 PHARM REV CODE 636 W HCPCS: Performed by: OBSTETRICS & GYNECOLOGY

## 2024-01-24 PROCEDURE — 81025 URINE PREGNANCY TEST: CPT | Performed by: OBSTETRICS & GYNECOLOGY

## 2024-01-24 PROCEDURE — 63600175 PHARM REV CODE 636 W HCPCS: Mod: JZ,JG | Performed by: ANESTHESIOLOGY

## 2024-01-24 PROCEDURE — A4216 STERILE WATER/SALINE, 10 ML: HCPCS | Performed by: OBSTETRICS & GYNECOLOGY

## 2024-01-24 PROCEDURE — 58146 MYOMECTOMY ABDOM COMPLEX: CPT | Mod: ,,, | Performed by: OBSTETRICS & GYNECOLOGY

## 2024-01-24 PROCEDURE — 88305 TISSUE EXAM BY PATHOLOGIST: CPT | Mod: 26,,, | Performed by: PATHOLOGY

## 2024-01-24 PROCEDURE — 64488 TAP BLOCK BI INJECTION: CPT | Performed by: ANESTHESIOLOGY

## 2024-01-24 PROCEDURE — 88305 TISSUE EXAM BY PATHOLOGIST: CPT | Performed by: PATHOLOGY

## 2024-01-24 PROCEDURE — 86870 RBC ANTIBODY IDENTIFICATION: CPT | Performed by: OBSTETRICS & GYNECOLOGY

## 2024-01-24 PROCEDURE — C9290 INJ, BUPIVACAINE LIPOSOME: HCPCS | Performed by: ANESTHESIOLOGY

## 2024-01-24 PROCEDURE — 25000003 PHARM REV CODE 250: Performed by: NURSE ANESTHETIST, CERTIFIED REGISTERED

## 2024-01-24 PROCEDURE — 71000033 HC RECOVERY, INTIAL HOUR: Performed by: OBSTETRICS & GYNECOLOGY

## 2024-01-24 DEVICE — ABSORBABLE ADHESION BARRIER
Type: IMPLANTABLE DEVICE | Site: ABDOMEN | Status: FUNCTIONAL
Brand: GYNECARE INTERCEED

## 2024-01-24 RX ORDER — FENTANYL CITRATE 50 UG/ML
INJECTION, SOLUTION INTRAMUSCULAR; INTRAVENOUS
Status: DISCONTINUED | OUTPATIENT
Start: 2024-01-24 | End: 2024-01-24

## 2024-01-24 RX ORDER — MIDAZOLAM HYDROCHLORIDE 1 MG/ML
INJECTION INTRAMUSCULAR; INTRAVENOUS
Status: DISCONTINUED | OUTPATIENT
Start: 2024-01-24 | End: 2024-01-24

## 2024-01-24 RX ORDER — BUPIVACAINE HYDROCHLORIDE 2.5 MG/ML
INJECTION, SOLUTION EPIDURAL; INFILTRATION; INTRACAUDAL
Status: DISCONTINUED | OUTPATIENT
Start: 2024-01-24 | End: 2024-01-24

## 2024-01-24 RX ORDER — BISACODYL 10 MG/1
10 SUPPOSITORY RECTAL DAILY PRN
Status: DISCONTINUED | OUTPATIENT
Start: 2024-01-24 | End: 2024-01-26 | Stop reason: HOSPADM

## 2024-01-24 RX ORDER — IBUPROFEN 800 MG/1
800 TABLET ORAL 3 TIMES DAILY
Status: DISCONTINUED | OUTPATIENT
Start: 2024-01-25 | End: 2024-01-26 | Stop reason: HOSPADM

## 2024-01-24 RX ORDER — SODIUM CHLORIDE 9 MG/ML
INJECTION, SOLUTION INTRAVENOUS CONTINUOUS
Status: DISCONTINUED | OUTPATIENT
Start: 2024-01-24 | End: 2024-01-24

## 2024-01-24 RX ORDER — SUCCINYLCHOLINE CHLORIDE 20 MG/ML
INJECTION INTRAMUSCULAR; INTRAVENOUS
Status: DISCONTINUED | OUTPATIENT
Start: 2024-01-24 | End: 2024-01-24

## 2024-01-24 RX ORDER — ONDANSETRON HYDROCHLORIDE 2 MG/ML
INJECTION, SOLUTION INTRAVENOUS
Status: DISCONTINUED | OUTPATIENT
Start: 2024-01-24 | End: 2024-01-24

## 2024-01-24 RX ORDER — KETOROLAC TROMETHAMINE 30 MG/ML
INJECTION, SOLUTION INTRAMUSCULAR; INTRAVENOUS
Status: DISCONTINUED | OUTPATIENT
Start: 2024-01-24 | End: 2024-01-24

## 2024-01-24 RX ORDER — HYDROMORPHONE HYDROCHLORIDE 2 MG/ML
0.2 INJECTION, SOLUTION INTRAMUSCULAR; INTRAVENOUS; SUBCUTANEOUS EVERY 5 MIN PRN
Status: DISCONTINUED | OUTPATIENT
Start: 2024-01-24 | End: 2024-01-24 | Stop reason: SDUPTHER

## 2024-01-24 RX ORDER — ONDANSETRON HYDROCHLORIDE 2 MG/ML
4 INJECTION, SOLUTION INTRAVENOUS DAILY PRN
Status: DISCONTINUED | OUTPATIENT
Start: 2024-01-24 | End: 2024-01-24 | Stop reason: SDUPTHER

## 2024-01-24 RX ORDER — DIPHENHYDRAMINE HYDROCHLORIDE 50 MG/ML
25 INJECTION INTRAMUSCULAR; INTRAVENOUS EVERY 4 HOURS PRN
Status: DISCONTINUED | OUTPATIENT
Start: 2024-01-24 | End: 2024-01-26 | Stop reason: HOSPADM

## 2024-01-24 RX ORDER — VASOPRESSIN 20 [USP'U]/ML
INJECTION, SOLUTION INTRAMUSCULAR; SUBCUTANEOUS
Status: DISCONTINUED | OUTPATIENT
Start: 2024-01-24 | End: 2024-01-24 | Stop reason: HOSPADM

## 2024-01-24 RX ORDER — ONDANSETRON HYDROCHLORIDE 2 MG/ML
4 INJECTION, SOLUTION INTRAVENOUS DAILY PRN
Status: DISCONTINUED | OUTPATIENT
Start: 2024-01-24 | End: 2024-01-24 | Stop reason: HOSPADM

## 2024-01-24 RX ORDER — HYDROMORPHONE HYDROCHLORIDE 2 MG/ML
0.2 INJECTION, SOLUTION INTRAMUSCULAR; INTRAVENOUS; SUBCUTANEOUS EVERY 5 MIN PRN
Status: DISCONTINUED | OUTPATIENT
Start: 2024-01-24 | End: 2024-01-24 | Stop reason: HOSPADM

## 2024-01-24 RX ORDER — FAMOTIDINE 20 MG/1
20 TABLET, FILM COATED ORAL
Status: COMPLETED | OUTPATIENT
Start: 2024-01-24 | End: 2024-01-24

## 2024-01-24 RX ORDER — KETOROLAC TROMETHAMINE 30 MG/ML
30 INJECTION, SOLUTION INTRAMUSCULAR; INTRAVENOUS EVERY 8 HOURS
Status: COMPLETED | OUTPATIENT
Start: 2024-01-24 | End: 2024-01-25

## 2024-01-24 RX ORDER — CHLORHEXIDINE GLUCONATE ORAL RINSE 1.2 MG/ML
10 SOLUTION DENTAL 2 TIMES DAILY
Status: DISCONTINUED | OUTPATIENT
Start: 2024-01-24 | End: 2024-01-26 | Stop reason: HOSPADM

## 2024-01-24 RX ORDER — ROCURONIUM BROMIDE 10 MG/ML
INJECTION, SOLUTION INTRAVENOUS
Status: DISCONTINUED | OUTPATIENT
Start: 2024-01-24 | End: 2024-01-24

## 2024-01-24 RX ORDER — CEFAZOLIN SODIUM 2 G/50ML
2 SOLUTION INTRAVENOUS
Status: DISCONTINUED | OUTPATIENT
Start: 2024-01-24 | End: 2024-01-24

## 2024-01-24 RX ORDER — CHLORHEXIDINE GLUCONATE ORAL RINSE 1.2 MG/ML
10 SOLUTION DENTAL
Status: DISCONTINUED | OUTPATIENT
Start: 2024-01-24 | End: 2024-01-24

## 2024-01-24 RX ORDER — HYDROMORPHONE HYDROCHLORIDE 2 MG/ML
1 INJECTION, SOLUTION INTRAMUSCULAR; INTRAVENOUS; SUBCUTANEOUS EVERY 6 HOURS PRN
Status: DISCONTINUED | OUTPATIENT
Start: 2024-01-24 | End: 2024-01-26 | Stop reason: HOSPADM

## 2024-01-24 RX ORDER — DIPHENHYDRAMINE HCL 25 MG
25 CAPSULE ORAL EVERY 4 HOURS PRN
Status: DISCONTINUED | OUTPATIENT
Start: 2024-01-24 | End: 2024-01-26 | Stop reason: HOSPADM

## 2024-01-24 RX ORDER — PROPOFOL 10 MG/ML
VIAL (ML) INTRAVENOUS
Status: DISCONTINUED | OUTPATIENT
Start: 2024-01-24 | End: 2024-01-24

## 2024-01-24 RX ORDER — OXYCODONE AND ACETAMINOPHEN 10; 325 MG/1; MG/1
1 TABLET ORAL EVERY 4 HOURS PRN
Status: DISCONTINUED | OUTPATIENT
Start: 2024-01-24 | End: 2024-01-26 | Stop reason: HOSPADM

## 2024-01-24 RX ORDER — AMOXICILLIN 250 MG
1 CAPSULE ORAL 2 TIMES DAILY
Status: DISCONTINUED | OUTPATIENT
Start: 2024-01-24 | End: 2024-01-26 | Stop reason: HOSPADM

## 2024-01-24 RX ORDER — OXYCODONE AND ACETAMINOPHEN 5; 325 MG/1; MG/1
1 TABLET ORAL EVERY 4 HOURS PRN
Status: DISCONTINUED | OUTPATIENT
Start: 2024-01-24 | End: 2024-01-26 | Stop reason: HOSPADM

## 2024-01-24 RX ORDER — KETOROLAC TROMETHAMINE 30 MG/ML
15 INJECTION, SOLUTION INTRAMUSCULAR; INTRAVENOUS EVERY 8 HOURS PRN
Status: DISCONTINUED | OUTPATIENT
Start: 2024-01-24 | End: 2024-01-24 | Stop reason: SDUPTHER

## 2024-01-24 RX ORDER — SODIUM CHLORIDE, SODIUM LACTATE, POTASSIUM CHLORIDE, CALCIUM CHLORIDE 600; 310; 30; 20 MG/100ML; MG/100ML; MG/100ML; MG/100ML
INJECTION, SOLUTION INTRAVENOUS CONTINUOUS PRN
Status: DISCONTINUED | OUTPATIENT
Start: 2024-01-24 | End: 2024-01-24

## 2024-01-24 RX ORDER — OXYCODONE AND ACETAMINOPHEN 5; 325 MG/1; MG/1
1 TABLET ORAL
Status: DISCONTINUED | OUTPATIENT
Start: 2024-01-24 | End: 2024-01-24 | Stop reason: HOSPADM

## 2024-01-24 RX ORDER — SODIUM CHLORIDE 9 MG/ML
INJECTION, SOLUTION INTRAMUSCULAR; INTRAVENOUS; SUBCUTANEOUS
Status: DISCONTINUED | OUTPATIENT
Start: 2024-01-24 | End: 2024-01-24 | Stop reason: HOSPADM

## 2024-01-24 RX ORDER — PROCHLORPERAZINE EDISYLATE 5 MG/ML
5 INJECTION INTRAMUSCULAR; INTRAVENOUS EVERY 6 HOURS PRN
Status: DISCONTINUED | OUTPATIENT
Start: 2024-01-24 | End: 2024-01-26 | Stop reason: HOSPADM

## 2024-01-24 RX ORDER — CEFAZOLIN SODIUM 2 G/50ML
2 SOLUTION INTRAVENOUS
Status: COMPLETED | OUTPATIENT
Start: 2024-01-24 | End: 2024-01-25

## 2024-01-24 RX ORDER — LANOLIN ALCOHOL/MO/W.PET/CERES
1 CREAM (GRAM) TOPICAL DAILY
Status: DISCONTINUED | OUTPATIENT
Start: 2024-01-25 | End: 2024-01-26 | Stop reason: HOSPADM

## 2024-01-24 RX ORDER — LIDOCAINE HYDROCHLORIDE 10 MG/ML
INJECTION, SOLUTION EPIDURAL; INFILTRATION; INTRACAUDAL; PERINEURAL
Status: DISCONTINUED | OUTPATIENT
Start: 2024-01-24 | End: 2024-01-24

## 2024-01-24 RX ORDER — ACETAMINOPHEN 500 MG
1000 TABLET ORAL
Status: COMPLETED | OUTPATIENT
Start: 2024-01-24 | End: 2024-01-24

## 2024-01-24 RX ORDER — KETOROLAC TROMETHAMINE 30 MG/ML
15 INJECTION, SOLUTION INTRAMUSCULAR; INTRAVENOUS EVERY 8 HOURS PRN
Status: DISCONTINUED | OUTPATIENT
Start: 2024-01-24 | End: 2024-01-24 | Stop reason: HOSPADM

## 2024-01-24 RX ORDER — OXYCODONE AND ACETAMINOPHEN 5; 325 MG/1; MG/1
1 TABLET ORAL
Status: DISCONTINUED | OUTPATIENT
Start: 2024-01-24 | End: 2024-01-24 | Stop reason: SDUPTHER

## 2024-01-24 RX ORDER — DOCUSATE SODIUM 100 MG/1
100 CAPSULE, LIQUID FILLED ORAL 2 TIMES DAILY
Status: DISCONTINUED | OUTPATIENT
Start: 2024-01-24 | End: 2024-01-26 | Stop reason: HOSPADM

## 2024-01-24 RX ORDER — ONDANSETRON 8 MG/1
8 TABLET, ORALLY DISINTEGRATING ORAL EVERY 8 HOURS PRN
Status: DISCONTINUED | OUTPATIENT
Start: 2024-01-24 | End: 2024-01-26 | Stop reason: HOSPADM

## 2024-01-24 RX ADMIN — BUPIVACAINE HYDROCHLORIDE 20 ML: 2.5 INJECTION, SOLUTION EPIDURAL; INFILTRATION; INTRACAUDAL; PERINEURAL at 11:01

## 2024-01-24 RX ADMIN — CHLORHEXIDINE GLUCONATE 0.12% ORAL RINSE 10 ML: 1.2 LIQUID ORAL at 09:01

## 2024-01-24 RX ADMIN — SODIUM CHLORIDE, SODIUM LACTATE, POTASSIUM CHLORIDE, AND CALCIUM CHLORIDE: 600; 310; 30; 20 INJECTION, SOLUTION INTRAVENOUS at 09:01

## 2024-01-24 RX ADMIN — ROCURONIUM BROMIDE 25 MG: 10 SOLUTION INTRAVENOUS at 09:01

## 2024-01-24 RX ADMIN — PROPOFOL 150 MG: 10 INJECTION, EMULSION INTRAVENOUS at 09:01

## 2024-01-24 RX ADMIN — KETOROLAC TROMETHAMINE 30 MG: 30 INJECTION, SOLUTION INTRAMUSCULAR; INTRAVENOUS at 09:01

## 2024-01-24 RX ADMIN — ACETAMINOPHEN 1000 MG: 500 TABLET ORAL at 08:01

## 2024-01-24 RX ADMIN — FAMOTIDINE 20 MG: 20 TABLET ORAL at 08:01

## 2024-01-24 RX ADMIN — OXYCODONE HYDROCHLORIDE AND ACETAMINOPHEN 1 TABLET: 5; 325 TABLET ORAL at 05:01

## 2024-01-24 RX ADMIN — LIDOCAINE HYDROCHLORIDE 50 MG: 10 SOLUTION INTRAVENOUS at 09:01

## 2024-01-24 RX ADMIN — SUCCINYLCHOLINE CHLORIDE 140 MG: 20 INJECTION, SOLUTION INTRAMUSCULAR; INTRAVENOUS; PARENTERAL at 09:01

## 2024-01-24 RX ADMIN — ROCURONIUM BROMIDE 10 MG: 10 SOLUTION INTRAVENOUS at 10:01

## 2024-01-24 RX ADMIN — DOCUSATE SODIUM 100 MG: 100 CAPSULE, LIQUID FILLED ORAL at 09:01

## 2024-01-24 RX ADMIN — ROCURONIUM BROMIDE 5 MG: 10 SOLUTION INTRAVENOUS at 09:01

## 2024-01-24 RX ADMIN — KETOROLAC TROMETHAMINE 30 MG: 30 INJECTION, SOLUTION INTRAMUSCULAR; INTRAVENOUS at 11:01

## 2024-01-24 RX ADMIN — ONDANSETRON 4 MG: 2 INJECTION INTRAMUSCULAR; INTRAVENOUS at 10:01

## 2024-01-24 RX ADMIN — MIDAZOLAM HYDROCHLORIDE 2 MG: 1 INJECTION, SOLUTION INTRAMUSCULAR; INTRAVENOUS at 09:01

## 2024-01-24 RX ADMIN — CEFAZOLIN SODIUM 2 G: 2 SOLUTION INTRAVENOUS at 05:01

## 2024-01-24 RX ADMIN — BUPIVACAINE 20 ML: 13.3 INJECTION, SUSPENSION, LIPOSOMAL INFILTRATION at 11:01

## 2024-01-24 RX ADMIN — CHLORHEXIDINE GLUCONATE 0.12% ORAL RINSE 10 ML: 1.2 LIQUID ORAL at 08:01

## 2024-01-24 RX ADMIN — OXYCODONE HYDROCHLORIDE AND ACETAMINOPHEN 1 TABLET: 5; 325 TABLET ORAL at 01:01

## 2024-01-24 RX ADMIN — FENTANYL CITRATE 100 MCG: 50 INJECTION, SOLUTION INTRAMUSCULAR; INTRAVENOUS at 09:01

## 2024-01-24 RX ADMIN — SENNOSIDES AND DOCUSATE SODIUM 1 TABLET: 50; 8.6 TABLET ORAL at 09:01

## 2024-01-24 NOTE — ANESTHESIA PROCEDURE NOTES
Bilateral Tap Block    Patient location during procedure: pre-op   Block not for primary anesthetic.  Reason for block: at surgeon's request and post-op pain management   Post-op Pain Location: abdominal wall pain   Start time: 1/24/2024 11:33 AM  Timeout: 1/24/2024 11:31 AM   End time: 1/24/2024 11:43 AM    Staffing  Authorizing Provider: Hay Benton II, MD  Performing Provider: Hay Benton II, MD    Staffing  Performed by: Hay Bentno II, MD  Authorized by: Hay Benton II, MD    Preanesthetic Checklist  Completed: patient identified, IV checked, site marked, risks and benefits discussed, surgical consent, monitors and equipment checked, pre-op evaluation and timeout performed  Peripheral Block  Patient position: supine  Prep: ChloraPrep  Patient monitoring: cardiac monitor, heart rate, continuous pulse ox, continuous capnometry and frequent blood pressure checks  Block type: TAP  Laterality: bilateral  Injection technique: single shot  Needle  Needle type: Stimuplex   Needle gauge: 21 G  Needle length: 4 in  Needle localization: ultrasound guidance   -ultrasound image captured on disc.  Assessment  Injection assessment: negative aspiration, negative parasthesia and local visualized surrounding nerve  Paresthesia pain: none  Heart rate change: no  Slow fractionated injection: yes  Pain Tolerance: no complaints  Medications:    Medications: bupivacaine (pf) (MARCAINE) injection 0.25% - Perineural   20 mL - 1/24/2024 11:43:00 AM    Additional Notes  VSS.  [facility] RN monitoring vitals throughout procedure.  Patient tolerated procedure well.

## 2024-01-24 NOTE — OP NOTE
[facility] ([facility])  General Surgery  Operative Note    SUMMARY     Date of Procedure: 1/24/2024     Procedure: Procedure(s) (LRB):  MYOMECTOMY (N/A)       Surgeon(s) and Role:     * Alba Tobin MD - Primary    Assistant:  Ashley Ward    Pre-Operative Diagnosis: Fibroids, submucosal [D25.0]  Iron deficiency anemia due to chronic blood loss [D50.0]  Menorrhagia with regular cycle [N92.0]    Post-Operative Diagnosis: Post-Op Diagnosis Codes:     * Fibroids, submucosal [D25.0]     * Iron deficiency anemia due to chronic blood loss [D50.0]     * Menorrhagia with regular cycle [N92.0]    Anesthesia: General    Operative Findings (including complications, if any): 10 wk size uterus; 1 large 6 cm fibroid filling and distorting the uterine cavity; 5 other intramural fibroids, evidence of adenomyosis    Description of Technical Procedures: abdominal myomectomy    Summary    General anesthesia induced without difficulty  Once under anesthesia, patient is positioned in stirrups and the vagina prepped with betadine solution and Ibrahim Catheter placed.   Positioned in supine position, abdominal prep with Choroprep, allowed to dry for 3 minutes, sterile drapes then applied  Time out taken with all members of the OR team    A pfanesteil  Skin incision was made approximately 2 cm above the symphis pubis and carried down sharply to the fascia.  Fascia was knicked in the midline and extended laterally.  Superior aspect of fascial incision was taken down sharply with cautery.  Attention was then turned to the lower aspect of the incision and rectus muscles taken down sharply.  Rectus muscles  in the midline,  the peritoneum was identified and entered bluntly.  This was extended superiorly and inferiorly with good visualization of the bladder.     The uterus was delivered  through the incision.         A dilute solution of pitressin was injected fundally and anteriorly to the lower uterine segment.    Bovie was  used to cauterize over the fibroid being careful to keep the incision midline.  A leahey clamp was placed and with blunt and sharp dissection of the  myomectomy was accomplished.  Entry into the uterine cavity was noted.  The myoma was handed off.  5 additonal intramural fibroids were dissected out through this incision.  The uterine cavity was closed in a running fashion with 2-0 vicryl.  The uterine defect was closed in layers using 2-0 v-lock suture until the serosal layer.  The serosal layer was closed with 4-0 monocryl .  Excellent hemostasis was noted.        The pelvis was copiously irrigated with normal saline. Lap sponges removed.    The incision site remained hemostatic.  The uterus was returned to the abdomen.  Interseed was placed over the anterior incison       Lap count noted to be correct.  The peritoneaum was closed with 2-0 vicryl.  Subfascial spaces where made hemostatic with cautery.  The fascia was closed with 0 vicryl in a running fashion after locking the first.   The subcutaneous tissue was copiously irrigated with normal saline and the  Subcutaneous space was closed with 2-0 plain.  The skin was then closed with 4 0 monocryl running    Aquasel dressing was placed over the incision.. Tap block was placed by anesthesia    Dressing placed, patient awakened and taken to PACU with reinoso catheter in place.    Significant Surgical Tasks Conducted by the Assistant(s), if Applicable: 1st assist    Estimated Blood Loss (EBL): * No values recorded between 1/24/2024 10:03 AM and 1/24/2024 11:51 AM *100cc           Implants:   Implant Name Type Inv. Item Serial No.  Lot No. LRB No. Used Action   BARRIER INTERCEED 3X4 ST DIS - SN/A  BARRIER INTERCEED 3X4 ST DIS N/A [employer] [id number] N/A 1 Implanted       Specimens:   Specimen (24h ago, onward)       Start     Ordered    01/24/24 1117  Specimen to Pathology, Surgery Gynecology and Obstetrics  Once        Comments: Pre-op Diagnosis: Fibroids,  submucosal [D25.0]Iron deficiency anemia due to chronic blood loss [D50.0]Menorrhagia with regular cycle [N92.0]Procedure(s):MYOMECTOMY Number of specimens: 1Name of specimens: 1. Fibroids - PERM     References:    Click here for ordering Quick Tip   Question Answer Comment   Procedure Type: Gynecology and Obstetrics    Specimen Class: Routine/Screening    Which provider would you like to cc? DACIA FISCHER    Release to patient Immediate        01/24/24 1115                            Condition: Good    Disposition: PACU - hemodynamically stable.    Attestation: I performed the procedure.

## 2024-01-24 NOTE — ANESTHESIA PREPROCEDURE EVALUATION
01/24/2024  Crista Momin is a 33 y.o., female.    Patient Active Problem List   Diagnosis    Menorrhagia with regular cycle    Iron deficiency anemia due to chronic blood loss    Fibroids, submucosal    Class 2 severe obesity due to excess calories with serious comorbidity and body mass index (BMI) of 39.0 to 39.9 in adult    Benign paroxysmal positional vertigo    Dietary folate deficiency anemia    Status post hysteroscopy with myosure resection of fibroid     Past Surgical History:   Procedure Laterality Date    HYSTEROSCOPIC POLYPECTOMY OF UTERUS N/A 10/4/2023    Procedure: POLYPECTOMY, UTERUS, HYSTEROSCOPIC;  Surgeon: Alba Tobin MD;  Location: [facility] OR;  Service: OB/GYN;  Laterality: N/A;    HYSTEROSCOPY N/A 10/4/2023    Procedure: HYSTEROSCOPY;  Surgeon: Alba Tobin MD;  Location: [facility] OR;  Service: OB/GYN;  Laterality: N/A;  D&C  Myosure       Pre-op Assessment    I have reviewed the Patient Summary Reports.    I have reviewed the NPO Status.   I have reviewed the Medications.     Review of Systems  Anesthesia Hx:  No problems with previous Anesthesia                Social:  Non-Smoker       Hematology/Oncology:       -- Anemia:                                  Cardiovascular:  Cardiovascular Normal                                            Pulmonary:    Asthma asymptomatic and mild                   Renal/:  Renal/ Normal                 Hepatic/GI:  Hepatic/GI Normal                 OB/GYN/PEDS:  Fibroids           Neurological:  Neurology Normal                                      Endocrine:  Endocrine Normal          Obesity / BMI > 30      Physical Exam  General: Well nourished    Airway:  Mallampati: II   Mouth Opening: Normal  TM Distance: Normal  Neck ROM: Normal ROM    Dental:  Intact        Anesthesia Plan  Type of Anesthesia, risks & benefits  discussed:    Anesthesia Type: Gen ETT  Intra-op Monitoring Plan: Standard ASA Monitors  Post Op Pain Control Plan: multimodal analgesia  Induction:  IV  Airway Plan: , Post-Induction  Informed Consent: Informed consent signed with the Patient and all parties understand the risks and agree with anesthesia plan.  All questions answered.   ASA Score: 2    Ready For Surgery From Anesthesia Perspective.     .    Chemistry        Component Value Date/Time     01/08/2024 1022    K 4.0 01/08/2024 1022     01/08/2024 1022    CO2 22 (L) 01/08/2024 1022    BUN 9 01/08/2024 1022    CREATININE 0.7 01/08/2024 1022     (H) 01/08/2024 1022        Component Value Date/Time    CALCIUM 8.7 01/08/2024 1022    ALKPHOS 35 (L) 08/12/2023 1224    AST 7 (L) 08/12/2023 1224    ALT <5 (L) 08/12/2023 1224    BILITOT 0.2 08/12/2023 1224        Lab Results   Component Value Date    WBC 5.79 01/08/2024    HGB 7.4 (L) 01/08/2024    HCT 28.3 (L) 01/08/2024    MCV 72 (L) 01/08/2024     01/08/2024

## 2024-01-24 NOTE — ANESTHESIA POSTPROCEDURE EVALUATION
Anesthesia Post Evaluation    Patient: Crista Momin    Procedure(s) Performed: Procedure(s) (LRB):  MYOMECTOMY (N/A)    Final Anesthesia Type: general      Patient location during evaluation: PACU  Patient participation: Yes- Able to Participate  Level of consciousness: awake and alert  Post-procedure vital signs: reviewed and stable  Pain management: adequate  Airway patency: patent  SHANNAN mitigation strategies: Verification of full reversal of neuromuscular block  PONV status at discharge: No PONV  Anesthetic complications: no      Cardiovascular status: hemodynamically stable  Respiratory status: spontaneous ventilation  Hydration status: euvolemic  Follow-up not needed.              Vitals Value Taken Time   /76 01/24/24 1345   Temp 36.4 °C (97.5 °F) 01/24/24 1155   Pulse 84 01/24/24 1355   Resp 17 01/24/24 1354   SpO2 100 % 01/24/24 1355   Vitals shown include unvalidated device data.      No case tracking events are documented in the log.      Pain/Vickie Score: Pain Rating Prior to Med Admin: 5 (1/24/2024  1:52 PM)  Vickie Score: 9 (1/24/2024  1:45 PM)

## 2024-01-24 NOTE — INTERVAL H&P NOTE
The patient has been examined and the H&P has been reviewed:    I concur with the findings and no changes have occurred since H&P was written.  She is ready to proceed with abdominal myomectomy    Surgery risks, benefits and alternative options discussed and understood by patient/family.    Post op course reviewed      There are no hospital problems to display for this patient.

## 2024-01-24 NOTE — PLAN OF CARE
Report given to BLANCA Be; vitals stable; no questions; family informed of patient's inpatient room

## 2024-01-24 NOTE — ANESTHESIA PROCEDURE NOTES
Intubation    Date/Time: 1/24/2024 9:50 AM    Performed by: Pa Pizarro CRNA  Authorized by: Hay Benton II, MD    Intubation:     Induction:  Intravenous    Intubated:  Postinduction    Mask Ventilation:  Easy mask    Attempts:  1    Attempted By:  CRNA    Method of Intubation:  Direct    Blade:  Elvira 3    Laryngeal View Grade: Grade IIA - cords partially seen      Difficult Airway Encountered?: No      Complications:  None    Airway Device:  Oral endotracheal tube    Airway Device Size:  7.0    Style/Cuff Inflation:  Cuffed (inflated to minimal occlusive pressure)    Inflation Amount (mL):  4    Tube secured:  22    Secured at:  The lips    Placement Verified By:  Capnometry    Complicating Factors:  None    Findings Post-Intubation:  BS equal bilateral and atraumatic/condition of teeth unchanged

## 2024-01-24 NOTE — HOSPITAL COURSE
01/24/2024--preop hgb 7.4; abdominal myomectomy accomplished--7 fibroids removed (3 submucous)  1/25: POD 1. Spiked a fever overnight which responded well to Tylenol. Afebrile this morning. Repeat CBC obtained.  1/26/24 POD#2: stable for discharge

## 2024-01-24 NOTE — ASSESSMENT & PLAN NOTE
01/24/2024  Hgb 7.4  Typed and screened , but per blood bank, has developed antibody  Continue iron post op

## 2024-01-24 NOTE — HPI
32 y/o with symptomatic uterine fibrods with menometrorrhgia to anemia.  Unresectable with myosure

## 2024-01-24 NOTE — TRANSFER OF CARE
"Anesthesia Transfer of Care Note    Patient: Crista Momin    Procedure(s) Performed: Procedure(s) (LRB):  MYOMECTOMY (N/A)    Patient location: PACU    Anesthesia Type: general    Transport from OR: Transported from OR on room air with adequate spontaneous ventilation    Post pain: adequate analgesia    Post assessment: no apparent anesthetic complications and tolerated procedure well    Post vital signs: stable    Level of consciousness: responds to stimulation    Nausea/Vomiting: no nausea/vomiting    Complications: none    Transfer of care protocol was followed      Last vitals: Visit Vitals  BP (!) 143/80 (BP Location: Right arm, Patient Position: Sitting)   Pulse 87   Temp 36.6 °C (97.9 °F) (Temporal)   Resp 16   Ht 5' 3" (1.6 m)   Wt 108.3 kg (238 lb 10.4 oz)   SpO2 100%   Breastfeeding No   BMI 42.27 kg/m²     "

## 2024-01-24 NOTE — INTERVAL H&P NOTE
The patient has been examined and the H&P has been reviewed:    Hgb 7.4; she will be typed and screened and consented for blood transfusion    Surgery risks, benefits and alternative options discussed and understood by patient/family.          There are no hospital problems to display for this patient.

## 2024-01-25 LAB
ACANTHOCYTES BLD QL SMEAR: PRESENT
ANISOCYTOSIS BLD QL SMEAR: SLIGHT
BASOPHILS # BLD AUTO: 0.02 K/UL (ref 0–0.2)
BASOPHILS # BLD AUTO: 0.03 K/UL (ref 0–0.2)
BASOPHILS NFR BLD: 0.1 % (ref 0–1.9)
BASOPHILS NFR BLD: 0.2 % (ref 0–1.9)
BLD PROD TYP BPU: NORMAL
BLOOD UNIT EXPIRATION DATE: NORMAL
BLOOD UNIT TYPE CODE: 5100
BLOOD UNIT TYPE: NORMAL
CODING SYSTEM: NORMAL
CROSSMATCH INTERPRETATION: NORMAL
DACRYOCYTES BLD QL SMEAR: ABNORMAL
DIFFERENTIAL METHOD BLD: ABNORMAL
DIFFERENTIAL METHOD BLD: ABNORMAL
DISPENSE STATUS: NORMAL
EOSINOPHIL # BLD AUTO: 0.1 K/UL (ref 0–0.5)
EOSINOPHIL # BLD AUTO: 0.1 K/UL (ref 0–0.5)
EOSINOPHIL NFR BLD: 0.4 % (ref 0–8)
EOSINOPHIL NFR BLD: 0.5 % (ref 0–8)
ERYTHROCYTE [DISTWIDTH] IN BLOOD BY AUTOMATED COUNT: 18.4 % (ref 11.5–14.5)
ERYTHROCYTE [DISTWIDTH] IN BLOOD BY AUTOMATED COUNT: 19.7 % (ref 11.5–14.5)
HCT VFR BLD AUTO: 21.5 % (ref 37–48.5)
HCT VFR BLD AUTO: 23.5 % (ref 37–48.5)
HGB BLD-MCNC: 5.9 G/DL (ref 12–16)
HGB BLD-MCNC: 6.7 G/DL (ref 12–16)
HYPOCHROMIA BLD QL SMEAR: ABNORMAL
IMM GRANULOCYTES # BLD AUTO: 0.07 K/UL (ref 0–0.04)
IMM GRANULOCYTES # BLD AUTO: 0.15 K/UL (ref 0–0.04)
IMM GRANULOCYTES NFR BLD AUTO: 0.5 % (ref 0–0.5)
IMM GRANULOCYTES NFR BLD AUTO: 1.1 % (ref 0–0.5)
LYMPHOCYTES # BLD AUTO: 1.2 K/UL (ref 1–4.8)
LYMPHOCYTES # BLD AUTO: 1.3 K/UL (ref 1–4.8)
LYMPHOCYTES NFR BLD: 8.5 % (ref 18–48)
LYMPHOCYTES NFR BLD: 9.5 % (ref 18–48)
MCH RBC QN AUTO: 18.4 PG (ref 27–31)
MCH RBC QN AUTO: 19.7 PG (ref 27–31)
MCHC RBC AUTO-ENTMCNC: 27.4 G/DL (ref 32–36)
MCHC RBC AUTO-ENTMCNC: 28.5 G/DL (ref 32–36)
MCV RBC AUTO: 67 FL (ref 82–98)
MCV RBC AUTO: 69 FL (ref 82–98)
MONOCYTES # BLD AUTO: 0.8 K/UL (ref 0.3–1)
MONOCYTES # BLD AUTO: 1.1 K/UL (ref 0.3–1)
MONOCYTES NFR BLD: 5.7 % (ref 4–15)
MONOCYTES NFR BLD: 8 % (ref 4–15)
NEUTROPHILS # BLD AUTO: 11.5 K/UL (ref 1.8–7.7)
NEUTROPHILS # BLD AUTO: 11.9 K/UL (ref 1.8–7.7)
NEUTROPHILS NFR BLD: 81.5 % (ref 38–73)
NEUTROPHILS NFR BLD: 84 % (ref 38–73)
NRBC BLD-RTO: 0 /100 WBC
NRBC BLD-RTO: 0 /100 WBC
NUM UNITS TRANS PACKED RBC: NORMAL
OVALOCYTES BLD QL SMEAR: ABNORMAL
PLATELET # BLD AUTO: 328 K/UL (ref 150–450)
PLATELET # BLD AUTO: 352 K/UL (ref 150–450)
PLATELET BLD QL SMEAR: ABNORMAL
PMV BLD AUTO: 10.6 FL (ref 9.2–12.9)
PMV BLD AUTO: 10.6 FL (ref 9.2–12.9)
POIKILOCYTOSIS BLD QL SMEAR: ABNORMAL
RBC # BLD AUTO: 3.2 M/UL (ref 4–5.4)
RBC # BLD AUTO: 3.4 M/UL (ref 4–5.4)
SCHISTOCYTES BLD QL SMEAR: PRESENT
WBC # BLD AUTO: 14.08 K/UL (ref 3.9–12.7)
WBC # BLD AUTO: 14.12 K/UL (ref 3.9–12.7)

## 2024-01-25 PROCEDURE — 94799 UNLISTED PULMONARY SVC/PX: CPT | Mod: XB

## 2024-01-25 PROCEDURE — 85025 COMPLETE CBC W/AUTO DIFF WBC: CPT | Performed by: OBSTETRICS & GYNECOLOGY

## 2024-01-25 PROCEDURE — 11000001 HC ACUTE MED/SURG PRIVATE ROOM

## 2024-01-25 PROCEDURE — 25000003 PHARM REV CODE 250: Performed by: OBSTETRICS & GYNECOLOGY

## 2024-01-25 PROCEDURE — 36415 COLL VENOUS BLD VENIPUNCTURE: CPT | Performed by: OBSTETRICS & GYNECOLOGY

## 2024-01-25 PROCEDURE — 86922 COMPATIBILITY TEST ANTIGLOB: CPT | Performed by: PHYSICIAN ASSISTANT

## 2024-01-25 PROCEDURE — 99900035 HC TECH TIME PER 15 MIN (STAT)

## 2024-01-25 PROCEDURE — 99231 SBSQ HOSP IP/OBS SF/LOW 25: CPT | Mod: ,,, | Performed by: OBSTETRICS & GYNECOLOGY

## 2024-01-25 PROCEDURE — 30233N1 TRANSFUSION OF NONAUTOLOGOUS RED BLOOD CELLS INTO PERIPHERAL VEIN, PERCUTANEOUS APPROACH: ICD-10-PCS | Performed by: OBSTETRICS & GYNECOLOGY

## 2024-01-25 PROCEDURE — 63600175 PHARM REV CODE 636 W HCPCS: Performed by: OBSTETRICS & GYNECOLOGY

## 2024-01-25 PROCEDURE — P9016 RBC LEUKOCYTES REDUCED: HCPCS | Performed by: PHYSICIAN ASSISTANT

## 2024-01-25 RX ORDER — HYDROCODONE BITARTRATE AND ACETAMINOPHEN 500; 5 MG/1; MG/1
TABLET ORAL
Status: DISCONTINUED | OUTPATIENT
Start: 2024-01-25 | End: 2024-01-26 | Stop reason: HOSPADM

## 2024-01-25 RX ADMIN — IBUPROFEN 800 MG: 800 TABLET, FILM COATED ORAL at 08:01

## 2024-01-25 RX ADMIN — SENNOSIDES AND DOCUSATE SODIUM 1 TABLET: 50; 8.6 TABLET ORAL at 08:01

## 2024-01-25 RX ADMIN — CEFAZOLIN SODIUM 2 G: 2 SOLUTION INTRAVENOUS at 02:01

## 2024-01-25 RX ADMIN — KETOROLAC TROMETHAMINE 30 MG: 30 INJECTION, SOLUTION INTRAMUSCULAR; INTRAVENOUS at 06:01

## 2024-01-25 RX ADMIN — OXYCODONE HYDROCHLORIDE AND ACETAMINOPHEN 1 TABLET: 5; 325 TABLET ORAL at 12:01

## 2024-01-25 RX ADMIN — Medication 1 EACH: at 08:01

## 2024-01-25 RX ADMIN — KETOROLAC TROMETHAMINE 30 MG: 30 INJECTION, SOLUTION INTRAMUSCULAR; INTRAVENOUS at 02:01

## 2024-01-25 RX ADMIN — DOCUSATE SODIUM 100 MG: 100 CAPSULE, LIQUID FILLED ORAL at 08:01

## 2024-01-25 RX ADMIN — OXYCODONE AND ACETAMINOPHEN 1 TABLET: 10; 325 TABLET ORAL at 07:01

## 2024-01-25 RX ADMIN — CHLORHEXIDINE GLUCONATE 0.12% ORAL RINSE 10 ML: 1.2 LIQUID ORAL at 08:01

## 2024-01-25 RX ADMIN — OXYCODONE HYDROCHLORIDE AND ACETAMINOPHEN 1 TABLET: 5; 325 TABLET ORAL at 08:01

## 2024-01-25 RX ADMIN — OXYCODONE HYDROCHLORIDE AND ACETAMINOPHEN 1 TABLET: 5; 325 TABLET ORAL at 03:01

## 2024-01-25 NOTE — ASSESSMENT & PLAN NOTE
Underwent abodminal myomectomy on 1/24/24.  - POD 1  - Tolerating PO  - Pain managed on current regimen  - Ambulating  - Voiding spontaneously  - Will monitor for fevers  - Repeat CBC pending

## 2024-01-25 NOTE — CARE UPDATE
Spoke with patient and reviewed her critical Hgb of 5.9. Advised patient that a transfusion of PRBC was recommended. Patient stated that she would receive the transfusion. Reviewed indications for blood transfusion as well as risks vs benefits. Patient verbalized understanding and agreed to plan. Blood consent already in her chart from 1/24/24.       FLORECITA Zavala, PA-C  OBGYN - Surgery  [facility]

## 2024-01-25 NOTE — SUBJECTIVE & OBJECTIVE
Interval History: POD 1 s/p abdominal myomectomy. Febrile overnight, but afebrile this morning during rounds. Doing well overall. Anemic upon admit, will repeat CBC. Nurse to remove pressure dressing today. Continue to monitor.    Scheduled Meds:   chlorhexidine  10 mL Mouth/Throat BID    docusate sodium  100 mg Oral BID    ferrous sulfate  1 tablet Oral Daily    ibuprofen  800 mg Oral TID    ketorolac  30 mg Intravenous Q8H    senna-docusate 8.6-50 mg  1 tablet Oral BID     Continuous Infusions:  PRN Meds:bisacodyL, diphenhydrAMINE, diphenhydrAMINE, HYDROmorphone, ondansetron, oxyCODONE-acetaminophen, oxyCODONE-acetaminophen, prochlorperazine    Review of patient's allergies indicates:  No Known Allergies    Objective:     Vital Signs (Most Recent):  Temp: 98 °F (36.7 °C) (01/25/24 0830)  Pulse: 95 (01/25/24 0830)  Resp: 18 (01/25/24 0831)  BP: 117/63 (01/25/24 0830)  SpO2: 99 % (01/25/24 0537) Vital Signs (24h Range):  Temp:  [97.5 °F (36.4 °C)-101.3 °F (38.5 °C)] 98 °F (36.7 °C)  Pulse:  [60-99] 95  Resp:  [12-26] 18  SpO2:  [95 %-100 %] 99 %  BP: ()/(55-87) 117/63     Weight: 108.3 kg (238 lb 10.4 oz)  Body mass index is 42.27 kg/m².  No LMP recorded.    I&O (Last 24H):    Intake/Output Summary (Last 24 hours) at 1/25/2024 1059  Last data filed at 1/25/2024 0532  Gross per 24 hour   Intake 1020 ml   Output 1000 ml   Net 20 ml         Laboratory:  Recent Lab Results       None          I have personallly reviewed all pertinent lab results from the last 24 hours.       Physical Exam:   Constitutional: She is oriented to person, place, and time. She appears well-developed. No distress.    HENT:   Head: Normocephalic and atraumatic.    Eyes: Conjunctivae are normal. Right eye exhibits no discharge. Left eye exhibits no discharge. No scleral icterus.      Pulmonary/Chest: Effort normal. No respiratory distress.        Abdominal: Soft. She exhibits abdominal incision (Pfannenstiel incision with pressure dressing  and acquacel dressing in place, CDI. Appropriately TTP.). She exhibits no distension.             Musculoskeletal: Normal range of motion. No edema.       Neurological: She is alert and oriented to person, place, and time.    Skin: Skin is warm and dry. She is not diaphoretic.

## 2024-01-25 NOTE — PROGRESS NOTES
[facility] ([facility])  Obstetrics & Gynecology  Progress Note    Patient Name: Crista Momin  MRN: [id number]  Admission Date: 1/24/2024  Primary Care Provider: HENRY Thompson MD  Principal Problem: S/P myomectomy    Subjective:     HPI:  34 y/o with symptomatic uterine fibrods with menometrorrhgia to anemia.  Unresectable with myosure    Interval History: POD 1 s/p abdominal myomectomy. Febrile overnight, but afebrile this morning during rounds. Doing well overall. Anemic upon admit, will repeat CBC. Nurse to remove pressure dressing today. Continue to monitor.    Scheduled Meds:   chlorhexidine  10 mL Mouth/Throat BID    docusate sodium  100 mg Oral BID    ferrous sulfate  1 tablet Oral Daily    ibuprofen  800 mg Oral TID    ketorolac  30 mg Intravenous Q8H    senna-docusate 8.6-50 mg  1 tablet Oral BID     Continuous Infusions:  PRN Meds:bisacodyL, diphenhydrAMINE, diphenhydrAMINE, HYDROmorphone, ondansetron, oxyCODONE-acetaminophen, oxyCODONE-acetaminophen, prochlorperazine    Review of patient's allergies indicates:  No Known Allergies    Objective:     Vital Signs (Most Recent):  Temp: 98 °F (36.7 °C) (01/25/24 0830)  Pulse: 95 (01/25/24 0830)  Resp: 18 (01/25/24 0831)  BP: 117/63 (01/25/24 0830)  SpO2: 99 % (01/25/24 0537) Vital Signs (24h Range):  Temp:  [97.5 °F (36.4 °C)-101.3 °F (38.5 °C)] 98 °F (36.7 °C)  Pulse:  [60-99] 95  Resp:  [12-26] 18  SpO2:  [95 %-100 %] 99 %  BP: ()/(55-87) 117/63     Weight: 108.3 kg (238 lb 10.4 oz)  Body mass index is 42.27 kg/m².  No LMP recorded.    I&O (Last 24H):    Intake/Output Summary (Last 24 hours) at 1/25/2024 1059  Last data filed at 1/25/2024 0532  Gross per 24 hour   Intake 1020 ml   Output 1000 ml   Net 20 ml         Laboratory:  Recent Lab Results       None          I have personallly reviewed all pertinent lab results from the last 24 hours.       Physical Exam:   Constitutional: She is oriented to person, place, and time. She  appears well-developed. No distress.    HENT:   Head: Normocephalic and atraumatic.    Eyes: Conjunctivae are normal. Right eye exhibits no discharge. Left eye exhibits no discharge. No scleral icterus.      Pulmonary/Chest: Effort normal. No respiratory distress.        Abdominal: Soft. She exhibits abdominal incision (Pfannenstiel incision with pressure dressing and acquacel dressing in place, CDI. Appropriately TTP.). She exhibits no distension.             Musculoskeletal: Normal range of motion. No edema.       Neurological: She is alert and oriented to person, place, and time.    Skin: Skin is warm and dry. She is not diaphoretic.             Assessment/Plan:     * S/P myomectomy  Underwent abodminal myomectomy on 1/24/24.  - POD 1  - Tolerating PO  - Pain managed on current regimen  - Ambulating  - Voiding spontaneously  - Will monitor for fevers  - Repeat CBC pending      Class 2 severe obesity due to excess calories with serious comorbidity and body mass index (BMI) of 39.0 to 39.9 in adult  01/25/2024  - Lovenox post op    Iron deficiency anemia due to chronic blood loss  01/24/2024  Hgb 7.4  Typed and screened , but per blood bank, has developed antibody  Continue iron post op    Menorrhagia with regular cycle  - Ok to d/c depo provera and oriannh and provera        Sadaf Rojas PA-C  Obstetrics & Gynecology  [facility] ([facility])

## 2024-01-25 NOTE — PLAN OF CARE
Will continue to monitor self care as well as nutritional intake. Will continue to monitor pain levels and blood transfusion. No apparent distress noted. Family at bedside.

## 2024-01-26 VITALS
OXYGEN SATURATION: 98 % | HEIGHT: 63 IN | TEMPERATURE: 99 F | RESPIRATION RATE: 18 BRPM | WEIGHT: 238.63 LBS | SYSTOLIC BLOOD PRESSURE: 124 MMHG | HEART RATE: 80 BPM | BODY MASS INDEX: 42.28 KG/M2 | DIASTOLIC BLOOD PRESSURE: 63 MMHG

## 2024-01-26 PROCEDURE — 25000003 PHARM REV CODE 250: Performed by: OBSTETRICS & GYNECOLOGY

## 2024-01-26 RX ORDER — BISACODYL 10 MG/1
10 SUPPOSITORY RECTAL DAILY PRN
Qty: 10 SUPPOSITORY | Refills: 0 | Status: SHIPPED | OUTPATIENT
Start: 2024-01-26 | End: 2024-02-05

## 2024-01-26 RX ORDER — DOCUSATE SODIUM 100 MG/1
100 CAPSULE, LIQUID FILLED ORAL 2 TIMES DAILY
Qty: 10 CAPSULE | Refills: 0 | Status: SHIPPED | OUTPATIENT
Start: 2024-01-26 | End: 2024-01-31

## 2024-01-26 RX ORDER — FERROUS SULFATE 325(65) MG
325 TABLET, DELAYED RELEASE (ENTERIC COATED) ORAL 2 TIMES DAILY
Qty: 60 TABLET | Refills: 11 | Status: SHIPPED | OUTPATIENT
Start: 2024-01-26 | End: 2025-01-25

## 2024-01-26 RX ORDER — OXYCODONE AND ACETAMINOPHEN 5; 325 MG/1; MG/1
1 TABLET ORAL
Qty: 15 TABLET | Refills: 0 | Status: SHIPPED | OUTPATIENT
Start: 2024-01-26 | End: 2024-02-27

## 2024-01-26 RX ORDER — IBUPROFEN 800 MG/1
800 TABLET ORAL 3 TIMES DAILY
Qty: 21 TABLET | Refills: 0 | Status: SHIPPED | OUTPATIENT
Start: 2024-01-26 | End: 2024-02-02

## 2024-01-26 RX ADMIN — Medication 1 EACH: at 08:01

## 2024-01-26 RX ADMIN — CHLORHEXIDINE GLUCONATE 0.12% ORAL RINSE 10 ML: 1.2 LIQUID ORAL at 08:01

## 2024-01-26 RX ADMIN — SENNOSIDES AND DOCUSATE SODIUM 1 TABLET: 50; 8.6 TABLET ORAL at 08:01

## 2024-01-26 RX ADMIN — DOCUSATE SODIUM 100 MG: 100 CAPSULE, LIQUID FILLED ORAL at 08:01

## 2024-01-26 RX ADMIN — IBUPROFEN 800 MG: 800 TABLET, FILM COATED ORAL at 08:01

## 2024-01-26 RX ADMIN — OXYCODONE AND ACETAMINOPHEN 1 TABLET: 10; 325 TABLET ORAL at 03:01

## 2024-01-26 NOTE — SUBJECTIVE & OBJECTIVE
Interval History: s/p open myomectomy    Scheduled Meds:   chlorhexidine  10 mL Mouth/Throat BID    docusate sodium  100 mg Oral BID    ferrous sulfate  1 tablet Oral Daily    ibuprofen  800 mg Oral TID    senna-docusate 8.6-50 mg  1 tablet Oral BID     Continuous Infusions:  PRN Meds:0.9%  NaCl infusion (for blood administration), bisacodyL, diphenhydrAMINE, diphenhydrAMINE, HYDROmorphone, ondansetron, oxyCODONE-acetaminophen, oxyCODONE-acetaminophen, prochlorperazine    Review of patient's allergies indicates:  No Known Allergies    Objective:     Vital Signs (Most Recent):  Temp: 97.8 °F (36.6 °C) (01/26/24 0835)  Pulse: 89 (01/26/24 0835)  Resp: 16 (01/26/24 0835)  BP: 120/63 (01/26/24 0835)  SpO2: 98 % (01/26/24 0835) Vital Signs (24h Range):  Temp:  [97.8 °F (36.6 °C)-99.1 °F (37.3 °C)] 97.8 °F (36.6 °C)  Pulse:  [81-95] 89  Resp:  [16-18] 16  SpO2:  [98 %] 98 %  BP: (118-137)/(58-74) 120/63     Weight: 108.3 kg (238 lb 10.4 oz)  Body mass index is 42.27 kg/m².  No LMP recorded.    I&O (Last 24H):    Intake/Output Summary (Last 24 hours) at 1/26/2024 1112  Last data filed at 1/25/2024 1735  Gross per 24 hour   Intake 372.92 ml   Output --   Net 372.92 ml         Laboratory:  CBC:   Recent Labs   Lab 01/25/24  1939   WBC 14.12*   RBC 3.40*   HGB 6.7*   HCT 23.5*      MCV 69*   MCH 19.7*   MCHC 28.5*       Diagnostic Results:  none     Physical Exam:   Constitutional: She is oriented to person, place, and time. She appears well-developed and well-nourished.        Pulmonary/Chest: Effort normal.        Abdominal: Soft. She exhibits abdominal incision. There is abdominal tenderness.   Tenderness approp. Aquacel c/d/i     Genitourinary:    Genitourinary Comments: scant             Musculoskeletal: Moves all extremeties.       Neurological: She is alert and oriented to person, place, and time.    Skin: Skin is warm and dry.    Psychiatric: She has a normal mood and affect. Her behavior is normal.         Review of Systems   Constitutional: Negative.    Gastrointestinal:         Postop incision   Neurological: Negative.

## 2024-01-26 NOTE — PLAN OF CARE
Patient afebrile and had no falls this shift.No bleeding or clots passed this shift. Voids spontaneously. Ambulates independently. Pain well controlled with oral pain medication. Vital signs stable at this time. Will continue to monitor.

## 2024-01-26 NOTE — PROGRESS NOTES
[facility] ([facility])  Obstetrics & Gynecology  Progress Note    Patient Name: Crista Momin  MRN: [id number]  Admission Date: 1/24/2024  Primary Care Provider: HENRY Thompson MD  Principal Problem: S/P myomectomy    Subjective:     HPI:  32 y/o with symptomatic uterine fibrods with menometrorrhgia to anemia.  Unresectable with myosure    Interval History: s/p open myomectomy    Scheduled Meds:   chlorhexidine  10 mL Mouth/Throat BID    docusate sodium  100 mg Oral BID    ferrous sulfate  1 tablet Oral Daily    ibuprofen  800 mg Oral TID    senna-docusate 8.6-50 mg  1 tablet Oral BID     Continuous Infusions:  PRN Meds:0.9%  NaCl infusion (for blood administration), bisacodyL, diphenhydrAMINE, diphenhydrAMINE, HYDROmorphone, ondansetron, oxyCODONE-acetaminophen, oxyCODONE-acetaminophen, prochlorperazine    Review of patient's allergies indicates:  No Known Allergies    Objective:     Vital Signs (Most Recent):  Temp: 97.8 °F (36.6 °C) (01/26/24 0835)  Pulse: 89 (01/26/24 0835)  Resp: 16 (01/26/24 0835)  BP: 120/63 (01/26/24 0835)  SpO2: 98 % (01/26/24 0835) Vital Signs (24h Range):  Temp:  [97.8 °F (36.6 °C)-99.1 °F (37.3 °C)] 97.8 °F (36.6 °C)  Pulse:  [81-95] 89  Resp:  [16-18] 16  SpO2:  [98 %] 98 %  BP: (118-137)/(58-74) 120/63     Weight: 108.3 kg (238 lb 10.4 oz)  Body mass index is 42.27 kg/m².  No LMP recorded.    I&O (Last 24H):    Intake/Output Summary (Last 24 hours) at 1/26/2024 1112  Last data filed at 1/25/2024 1735  Gross per 24 hour   Intake 372.92 ml   Output --   Net 372.92 ml         Laboratory:  CBC:   Recent Labs   Lab 01/25/24  1939   WBC 14.12*   RBC 3.40*   HGB 6.7*   HCT 23.5*      MCV 69*   MCH 19.7*   MCHC 28.5*       Diagnostic Results:  none     Physical Exam:   Constitutional: She is oriented to person, place, and time. She appears well-developed and well-nourished.        Pulmonary/Chest: Effort normal.        Abdominal: Soft. She exhibits abdominal  incision. There is abdominal tenderness.   Tenderness approp. Aquacel c/d/i     Genitourinary:    Genitourinary Comments: scant             Musculoskeletal: Moves all extremeties.       Neurological: She is alert and oriented to person, place, and time.    Skin: Skin is warm and dry.    Psychiatric: She has a normal mood and affect. Her behavior is normal.        Review of Systems   Constitutional: Negative.    Gastrointestinal:         Postop incision   Neurological: Negative.        Assessment/Plan:     * S/P myomectomy  Open myomectomy POD#2: stable for discharge    Class 2 severe obesity due to excess calories with serious comorbidity and body mass index (BMI) of 39.0 to 39.9 in adult  01/25/2024  - Lovenox post op    Iron deficiency anemia due to chronic blood loss  01/26/2024  Rec outpatient OTC iron supplementation    Menorrhagia with regular cycle             Georgina Brown MD  Obstetrics & Gynecology  [facility] ([facility])

## 2024-01-26 NOTE — PLAN OF CARE
One unit of  PRBC infusion complete. Tolerated well. No adverse reactions. Will continue to monitor.

## 2024-01-26 NOTE — DISCHARGE SUMMARY
[facility] ([facility])  Obstetrics & Gynecology  Discharge Summary    Patient Name: Crista Momin  MRN: [id number]  Admission Date: 1/24/2024  Hospital Length of Stay: 2 days  Discharge Date and Time:  1/26/24  Attending Physician: Alba Tobin MD   Discharging Provider: Georgina Brown MD  Primary Care Provider: HENRY Thompson MD    HPI:  32 y/o with symptomatic uterine fibrods with menometrorrhgia to anemia.  Unresectable with myosure    Hospital Course:  01/24/2024--preop hgb 7.4; abdominal myomectomy accomplished--7 fibroids removed (3 submucous)  1/25: POD 1. Spiked a fever overnight which responded well to Tylenol. Afebrile this morning. Repeat CBC obtained.  1/26/24 POD#2: stable for discharge    Goals of Care Treatment Preferences:  Code Status: Full Code      Procedure(s) (LRB):  MYOMECTOMY (N/A)         Significant Diagnostic Studies: N/A      Pending Diagnostic Studies:       Procedure Component Value Units Date/Time    Specimen to Pathology, Surgery Gynecology and Obstetrics [[id number]] Collected: 01/24/24 1118    Order Status: Sent Lab Status: In process Updated: 01/24/24 1207    Specimen: Tissue           Final Active Diagnoses:    Diagnosis Date Noted POA    PRINCIPAL PROBLEM:  S/P myomectomy [Z98.890] 10/04/2023 Not Applicable    Class 2 severe obesity due to excess calories with serious comorbidity and body mass index (BMI) of 39.0 to 39.9 in adult [E66.01, Z68.39] 08/16/2023 Not Applicable     Chronic    Menorrhagia with regular cycle [N92.0] 08/12/2023 Yes     Chronic    Iron deficiency anemia due to chronic blood loss [D50.0] 08/12/2023 Yes     Chronic      Problems Resolved During this Admission:        Discharged Condition: good    Disposition: home    Follow Up:   Follow-up Information       Alba Tobin MD. Go in 1 week(s).    Specialty: Obstetrics and Gynecology  Why: post op appt/dressing removal ([facility])  Contact information:  [address] [facility] [place]  [id number] [phone]               Alba Tobin MD. Go in 4 week(s).    Specialty: Obstetrics and Gynecology  Why: post op check  Contact information:  [address] [address] [phone]                           Patient Instructions:   6 weeks pelvic rest  Medications:  Reconciled Home Medications:      Medication List        START taking these medications      bisacodyL 10 mg Supp  Commonly known as: DULCOLAX  Place 1 suppository (10 mg total) rectally daily as needed (Until bowel movement if patient has no bowel movement for 2 days).     docusate sodium 100 MG capsule  Commonly known as: COLACE  Take 1 capsule (100 mg total) by mouth 2 (two) times daily. for 10 doses     ferrous sulfate 325 (65 FE) MG EC tablet  Take 1 tablet (325 mg total) by mouth 2 (two) times daily.     ibuprofen 800 MG tablet  Commonly known as: ADVIL,MOTRIN  Take 1 tablet (800 mg total) by mouth 3 (three) times daily. for 7 days     oxyCODONE-acetaminophen 5-325 mg per tablet  Commonly known as: PERCOCET  Take 1 tablet by mouth every 6 to 8 hours as needed for Pain.            CONTINUE taking these medications      folic acid 1 MG tablet  Commonly known as: FOLVITE  Take 1 tablet (1 mg total) by mouth once daily.            STOP taking these medications      medroxyPROGESTERone 10 MG tablet  Commonly known as: PROVERA     ORIAHNN 300-1-0.5mg(AM) /300 mg(PM) Cpsq  Generic drug: elagolix-estradiol-norethindrn              Georgina Brown MD  Obstetrics & Gynecology  [facility] ([facility])

## 2024-01-29 LAB
FINAL PATHOLOGIC DIAGNOSIS: NORMAL
GROSS: NORMAL
Lab: NORMAL

## 2024-01-30 ENCOUNTER — OFFICE VISIT (OUTPATIENT)
Dept: OBSTETRICS AND GYNECOLOGY | Facility: CLINIC | Age: 34
End: 2024-01-30
Payer: COMMERCIAL

## 2024-01-30 VITALS
WEIGHT: 240.94 LBS | DIASTOLIC BLOOD PRESSURE: 93 MMHG | SYSTOLIC BLOOD PRESSURE: 138 MMHG | BODY MASS INDEX: 42.69 KG/M2 | HEIGHT: 63 IN

## 2024-01-30 DIAGNOSIS — Z98.890 S/P MYOMECTOMY: Primary | ICD-10-CM

## 2024-01-30 PROCEDURE — 1159F MED LIST DOCD IN RCRD: CPT | Mod: CPTII,S$GLB,, | Performed by: PHYSICIAN ASSISTANT

## 2024-01-30 PROCEDURE — 3080F DIAST BP >= 90 MM HG: CPT | Mod: CPTII,S$GLB,, | Performed by: PHYSICIAN ASSISTANT

## 2024-01-30 PROCEDURE — 99999 PR PBB SHADOW E&M-EST. PATIENT-LVL III: CPT | Mod: PBBFAC,,, | Performed by: PHYSICIAN ASSISTANT

## 2024-01-30 PROCEDURE — 3075F SYST BP GE 130 - 139MM HG: CPT | Mod: CPTII,S$GLB,, | Performed by: PHYSICIAN ASSISTANT

## 2024-01-30 PROCEDURE — 99024 POSTOP FOLLOW-UP VISIT: CPT | Mod: S$GLB,,, | Performed by: PHYSICIAN ASSISTANT

## 2024-01-30 NOTE — PROGRESS NOTES
"  OBGYN Post-op Clinic  History and Physical    Patient Name: Crista Momin  YOB: 1990 (33 y.o.)  MRN: [id number]  Today's Date: 2024    Referring Md:   No referring provider defined for this encounter.    SUBJECTIVE:     Chief Complaint: Post-op Dressing Removal    History of Present Illness:  Crista Momin is a 33 y.o. female  who presents to the clinic today for acquacel dressing removal. S/p myomectomy on 24. States that she is doing really well. Pain is managed with Ibuprofen. Ambulating well. Denies fever, chills, nausea, vomiting, or other issues.  Of note, BP was a little elevated today at 138/93. Patient states that she does not have a history of HTN, but she was nervous about the dressing removal. Advised patient to f/u with PCP if BP is noted to be elevated at future visits.       Review of patient's allergies indicates:  No Known Allergies    Past Medical History:   Diagnosis Date    General anesthetics causing adverse effect in therapeutic use     "tightness in chest x2 days"    Mild intermittent asthma, uncomplicated      Past Surgical History:   Procedure Laterality Date    HYSTEROSCOPIC POLYPECTOMY OF UTERUS N/A 10/4/2023    Procedure: POLYPECTOMY, UTERUS, HYSTEROSCOPIC;  Surgeon: Alba Tobin MD;  Location: [facility] OR;  Service: OB/GYN;  Laterality: N/A;    HYSTEROSCOPY N/A 10/4/2023    Procedure: HYSTEROSCOPY;  Surgeon: Alba Tobin MD;  Location: [facility] OR;  Service: OB/GYN;  Laterality: N/A;  D&C  Myosure    MYOMECTOMY N/A 2024    Procedure: MYOMECTOMY;  Surgeon: Alba Tobin MD;  Location: [facility] OR;  Service: OB/GYN;  Laterality: N/A;     Family History   Problem Relation Age of Onset    No Known Problems Mother     No Known Problems Father     Cancer Sister      Social History     Tobacco Use    Smoking status: Never    Smokeless tobacco: Never   Substance Use Topics    Alcohol use: Never    Drug use: Never        Review of " "Systems:  Review of Systems   Constitutional:  Negative for chills and fever.   HENT:  Negative for congestion and sore throat.    Respiratory:  Negative for cough and shortness of breath.    Cardiovascular:  Negative for chest pain and leg swelling.   Gastrointestinal:  Negative for constipation, diarrhea, nausea and vomiting.   Genitourinary:  Negative for dysuria.   Musculoskeletal:  Negative for myalgias.   Skin:  Negative for rash.   Neurological:  Negative for weakness and headaches.       OBJECTIVE:     Vital Signs (Most Recent)  BP (!) 138/93   Ht 5' 3" (1.6 m)   Wt 109.3 kg (240 lb 15.4 oz)   BMI 42.68 kg/m²     Physical Exam  Vitals and nursing note reviewed.   Constitutional:       General: She is not in acute distress.     Appearance: Normal appearance.   HENT:      Head: Normocephalic and atraumatic.   Eyes:      General: No scleral icterus.        Right eye: No discharge.         Left eye: No discharge.      Conjunctiva/sclera: Conjunctivae normal.   Pulmonary:      Effort: Pulmonary effort is normal. No respiratory distress.   Abdominal:      Palpations: Abdomen is soft.      Tenderness: There is abdominal tenderness (Appropriately TTP).      Comments: Aquacel dressing over Pfannenstiel incision removed in clinic. Incision intact with no erythema, warmth, or drainage.    Musculoskeletal:         General: Normal range of motion.      Cervical back: Normal range of motion.      Right lower leg: No edema.      Left lower leg: No edema.   Skin:     General: Skin is warm and dry.   Neurological:      General: No focal deficit present.      Mental Status: She is alert and oriented to person, place, and time.   Psychiatric:         Mood and Affect: Mood normal.         Behavior: Behavior normal.         Thought Content: Thought content normal.         Judgment: Judgment normal.             ASSESSMENT/PLAN:     Crista Momin is a 33 y.o. female was seen today for dressing removal. Doing extremely " well.   Once again, I reviewed all restrictions & limitations with the patient and instructed her to call clinic with any concerning issues or symptoms. Instructed the importance of showering daily, no tub baths/soaking in water of any kind, and using an antibacterial soap. Patient verbalized understanding.     Crista was seen today for dressing change.    Diagnoses and all orders for this visit:    S/P myomectomy         - Showers only, pelvic rest, and no heavy lifting/strenuous activity for 4-6 weeks       - Ibuprofen PRN for pain       - Keep f/u appointment with surgeon as previously scheduled        FLORECITA Zavala, PA-C  OBGYN - Surgery  [facility]

## 2024-02-01 NOTE — PHYSICIAN QUERY
PT Name: Crista Momin  MR #: [id number]    DOCUMENTATION CLARIFICATION      CDS/: EARNEST Otero,RNC-MNN       Contact information:[email or web address]    This form is a permanent document in the medical record.      Query Date: February 1, 2024    By submitting this query, we are merely seeking further clarification of documentation. Please utilize your independent clinical judgment when addressing the question(s) below.    The Medical Record contains the following:   Indicators  Supporting Clinical Findings Location in Medical Record   X Anemia documented Iron deficiency anemia due to chronic blood loss  OB Progress note 1/26@1116am    X H&H Hgb=5.9-->6.7  Hct=21.5-->23.5 LAB 1/25    BP                    HR      Bleeding     X Procedure/Surgery Performed/EBL Procedure(s) (LRB):  MYOMECTOMY     Estimated Blood Loss (EBL): **100cc  Op note 1/24   X Transfusion(s) critical Hgb of 5.9. Advised patient that a transfusion of PRBC was recommended. Patient stated that she would receive the transfusion. Reviewed indications for blood transfusion as well as risks vs benefits. Patient verbalized understanding and agreed to plan. Blood consent already in her chart from 1/24/24.   OB Care update 1/25@247pm   X Acute/Chronic illness s/p open myomectomy     symptomatic uterine fibrods with menometrorrhgia to anemia.  OB Progress note 1/26@1116am    X Treatments Rec outpatient OTC iron supplementation  OB Progress note 1/26@1116am     Other       Provider, please specify diagnosis or diagnoses associated with above clinical findings.   [  X   ] Acute on chronic blood loss anemia    [   ] Chronic blood loss anemia     [   ] Other : _________________   [   ] Clinically Undetermined     Please document in your progress notes daily for the duration of treatment, until resolved, and include in your discharge summary.    Form No. [id number]

## 2024-02-27 ENCOUNTER — OFFICE VISIT (OUTPATIENT)
Dept: OBSTETRICS AND GYNECOLOGY | Facility: CLINIC | Age: 34
End: 2024-02-27
Payer: COMMERCIAL

## 2024-02-27 VITALS
DIASTOLIC BLOOD PRESSURE: 80 MMHG | HEIGHT: 63 IN | WEIGHT: 244.25 LBS | SYSTOLIC BLOOD PRESSURE: 122 MMHG | BODY MASS INDEX: 43.28 KG/M2

## 2024-02-27 DIAGNOSIS — Z98.890 S/P MYOMECTOMY: Primary | ICD-10-CM

## 2024-02-27 PROCEDURE — 3079F DIAST BP 80-89 MM HG: CPT | Mod: CPTII,S$GLB,, | Performed by: OBSTETRICS & GYNECOLOGY

## 2024-02-27 PROCEDURE — 99999 PR PBB SHADOW E&M-EST. PATIENT-LVL III: CPT | Mod: PBBFAC,,, | Performed by: OBSTETRICS & GYNECOLOGY

## 2024-02-27 PROCEDURE — 99024 POSTOP FOLLOW-UP VISIT: CPT | Mod: S$GLB,,, | Performed by: OBSTETRICS & GYNECOLOGY

## 2024-02-27 PROCEDURE — 1159F MED LIST DOCD IN RCRD: CPT | Mod: CPTII,S$GLB,, | Performed by: OBSTETRICS & GYNECOLOGY

## 2024-02-27 PROCEDURE — 3074F SYST BP LT 130 MM HG: CPT | Mod: CPTII,S$GLB,, | Performed by: OBSTETRICS & GYNECOLOGY

## 2024-02-27 NOTE — PROGRESS NOTES
Subjective:       Patient ID: Crista Momin is a 33 y.o. female.    Chief Complaint:  Post-op Evaluation      History of Present Illness  HPI  Postoperative Follow-up  Patient presents to the clinic 4 weeks status post abdominal myomectomy for abnormal uterine bleeding and fibroids. Eating a regular diet without difficulty. Bowel movements are normal. The patient is not having any pain.  Patient reports bleeding since surgery  Feels like it was heavier last week but now tapering away--suspect that was her menstrual cycle  Expects to return to work 3/5/24  GYN & OB History  No LMP recorded (lmp unknown).   Date of Last Pap: No result found    OB History    Para Term  AB Living   0 0 0 0 0 0   SAB IAB Ectopic Multiple Live Births   0 0 0 0 0       Review of Systems  Review of Systems   Genitourinary:  Positive for menstrual problem.   All other systems reviewed and are negative.          Objective:      Physical Exam:   Constitutional: She is oriented to person, place, and time. She appears well-developed and well-nourished.     Eyes: Pupils are equal, round, and reactive to light. Conjunctivae and EOM are normal.      Pulmonary/Chest: Effort normal. Right breast exhibits no mass, no nipple discharge, no skin change, no tenderness and presence. Left breast exhibits no mass, no nipple discharge, no skin change, no tenderness and presence. Breasts are symmetrical.        Abdominal: Soft. She exhibits abdominal incision (well healed).     Genitourinary:    Vagina, uterus, right adnexa, left adnexa and rectum normal.      Pelvic exam was performed with patient supine.   The external female genitalia was normal.     Labial bartholins normal.Cervix is normal. Right adnexum displays no mass and no tenderness. Left adnexum displays no mass and no tenderness. No erythema, vaginal discharge, bleeding, rectocele, cystocele or prolapse of vaginal walls in the vagina. Vagina was moist.Uerus contour normal   Normal urethral meatus.Urethra findings: no urethral massBladder findings: no bladder distention and no bladder tenderness          Musculoskeletal: Normal range of motion and moves all extremeties.       Neurological: She is alert and oriented to person, place, and time.    Skin: Skin is warm.    Psychiatric: She has a normal mood and affect. Her behavior is normal.           Assessment:        1. S/P myomectomy               Plan:      Released from gyn care  Continue menstrual calendar  Continue iron  Ww exam due after 8/2024  Mammo due age 38

## 2024-07-25 NOTE — TELEPHONE ENCOUNTER
Refill Decision Note   Lety Vogel  is requesting a refill authorization.    Brief Assessment and Rationale for Refill:  Approve       Medication Therapy Plan:         Comments:     Note composed:4:25 PM 07/25/2024             ""The number you have called is not in service"  "

## (undated) DEVICE — SPONGE LAP 18X18 PREWASHED

## (undated) DEVICE — PACK BASIC SETUP SC BR

## (undated) DEVICE — MANIFOLD 4 PORT

## (undated) DEVICE — NDL SPINAL SPINOCAN 22GX3.5

## (undated) DEVICE — SUT 2/0 27IN PLAIN GUT CT

## (undated) DEVICE — GLOVE SIGNATURE ESSNTL LTX 7

## (undated) DEVICE — TUBING MEDI-VAC 20FT .25IN

## (undated) DEVICE — SUT VICRYL 2-0 27 CT-1

## (undated) DEVICE — NDL BLUNT W/O FILTER 18GX1.5IN

## (undated) DEVICE — DRAPE UINDERBUT GRAD PCH

## (undated) DEVICE — SOL IRR STRL WATER 500ML

## (undated) DEVICE — SUT V-LOC 2.0 GS-21 90 DAY

## (undated) DEVICE — HEADREST ROUND DISP FOAM 9IN

## (undated) DEVICE — JELLY SURGILUBE LUBE PKT 3GM

## (undated) DEVICE — PACK FLUENT DISPOSABLE

## (undated) DEVICE — COVER LIGHT HANDLE 80/CA

## (undated) DEVICE — SET CYSTO IRRIGATION UNIV SPIK

## (undated) DEVICE — TRAY SKIN SCRUB WET PREMIUM

## (undated) DEVICE — TOWEL OR DISP STRL BLUE 4/PK

## (undated) DEVICE — DRESSING AQUACEL AG 3.5X10IN

## (undated) DEVICE — SUT MONOCRYL 4-0 PS-1 UND

## (undated) DEVICE — SEAL LENS SCOPE MYOSURE

## (undated) DEVICE — COVER OVERHEAD SURG LT BLUE

## (undated) DEVICE — STAPLER SKIN PROXIMATE WIDE

## (undated) DEVICE — CATH URETHRAL RED RUBBER 18FR

## (undated) DEVICE — SUT CHROMIC 3-0 SH 27IN GUT

## (undated) DEVICE — GLOVE SENSICARE PI GRN 7

## (undated) DEVICE — Device

## (undated) DEVICE — GOWN POLY REINF BRTH SLV XL

## (undated) DEVICE — SPONGE COTTON TRAY 4X4IN

## (undated) DEVICE — ELECTRODE REM PLYHSV RETURN 9

## (undated) DEVICE — PACK DRAPE PERI/GYN TIBURON

## (undated) DEVICE — SYR B-D DISP CONTROL 10CC100/C

## (undated) DEVICE — SYR 3CC LUER LOC

## (undated) DEVICE — DRAPE T TRNSVRS LAP 102X78X121

## (undated) DEVICE — TRAY SKIN SCRUB WET 4 COMPART

## (undated) DEVICE — APPLICATOR CHLORAPREP ORN 26ML

## (undated) DEVICE — UNDERGLOVES BIOGEL PI SZ 7 LF

## (undated) DEVICE — PAD ABDOMINAL STERILE 8X10IN

## (undated) DEVICE — SET DECANTER MEDICHOICE

## (undated) DEVICE — GAUZE WOVEN STRL 12-PLY 4X4IN

## (undated) DEVICE — SUT MONOCRYL 3-0 SH U/D

## (undated) DEVICE — ADHESIVE MASTISOL VIAL 48/BX

## (undated) DEVICE — SEE L#153236

## (undated) DEVICE — SOL IRR NACL .9% 3000ML

## (undated) DEVICE — SUT VICRYL PLUS 0 CT1 36IN

## (undated) DEVICE — CONTAINER SPECIMEN OR STER 4OZ

## (undated) DEVICE — TRAY CATH FOL SIL URIMTR 16FR

## (undated) DEVICE — DRESSING TELFA N ADH 3X8